# Patient Record
Sex: FEMALE | URBAN - METROPOLITAN AREA
[De-identification: names, ages, dates, MRNs, and addresses within clinical notes are randomized per-mention and may not be internally consistent; named-entity substitution may affect disease eponyms.]

---

## 2021-07-27 ENCOUNTER — HOSPITAL ENCOUNTER (OUTPATIENT)
Dept: PHYSICAL THERAPY | Facility: CLINIC | Age: 51
Setting detail: THERAPIES SERIES
Discharge: HOME | End: 2021-07-27
Payer: COMMERCIAL

## 2021-07-27 PROCEDURE — 97110 THERAPEUTIC EXERCISES: CPT

## 2021-07-27 PROCEDURE — 97161 PT EVAL LOW COMPLEX 20 MIN: CPT

## 2021-07-27 NOTE — CONSULTS
[] [address]  [address]  [phone]  F: [phone] [] [address]  [facility]   [address]  P: [phone]  F: [phone] [] [address]  [address]  P: [phone]  F: [phone] [] [address]  P: [phone]  F: [phone] [] [address]  [facility]   [address]   [place]: [phone]  F: [phone]      Physical Therapy Lower Extremity Evaluation    Date:  2021  Patient: Grazyna Mckenzie  : 1970  MRN: [id number]  Physician: [address] [place] [address] [facility]   Medical Diagnosis: G57.02- piriformis syndrome of left side  Rehab Codes: G57.02  Onset date: 2021  Next 's appt.: TBD- pending success with PT services     Subjective:   CC:    Reports symptoms began post 2021 with completing x5 mile walk   Similar condition in 2017, 2018- underwent PT services- with relief- however- symptoms less intense at that time   Issued muscle relaxer, ibuprofen- with relief- however- when ceases- pain returns  Attempted previous HEP interventions- no relief- caused inc pain   Denies R LE pain   New onset of L knee pain   Denies surgery thus far to either hip and knee     HPI: 2021; see above     PMHx: [x] Unremarkable [] Diabetes [x] HTN  [] Pacemaker   [] MI/Heart Problems [] Cancer [x] Arthritis [x] Other: asthma. [x] Refer to full medical chart  In EPIC     Comorbidities: NONE  [] Obesity [] Dialysis  [] N/A   [] Asthma/COPD [] Dementia [] Other:   [] Stroke [] Sleep apnea [] Other:   [] Vascular disease [] Rheumatic disease [] Other:     Tests: [] X-Ray: [] MRI:  [x] Other: nothing to L hip thus far.      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    Job Status [x]  Normal duty   [] Light duty   [] Off due to condition    []  Retired   [] Not employed   [] Disability  [] Other:  []  Return to work: Work activities/duties SEE ABOVE- not limited      Gait Prior level of function Current level of function    [x] Independent  [] Assist [x] Independent  [] Assist   Device: [] Independent [] Independent    [] Straight Cane [] Quad cane [] Straight Cane [] Quad cane    [] Standard walker [] Rolling walker   [] 4 wheeled walker [] Standard walker [] Rolling walker   [] 4 wheeled walker    [] Wheelchair [] Wheelchair     Pain:  [x] Yes  [] No Location: L buttock into distal calf; \"rope being pulled- tight;\" muscular pain    Pain Rating: (0-10 scale) 7/10- MIN/OVER THIS PAST WEEK; 10/10- MAX/OVER THIS PAST WEEK   Pain altered Tx:  [x] Yes  [] No  Action:  Symptoms:  [] Improving [x] Worsening [] Same  Better:  [] AM    [] PM    [] Sit    [] Rise/Sit    []Stand    [x] Walk/exercise    [] Lying    [] Other:  Worse: [] AM    [] PM    [] Sit    [] Rise/Sit    [x]Stand    [] Walk    [] Lying    [] Bend                      [] Valsalva    [] Other: sitting, sleeping upon either side, \"a little bit\" with ascending and descending stairs. Sleep: [] OK    [x] Disturbed- intermittent waking at night d/t inc pain, but able to reposition and fall back asleep    Denies L LE N/T, burning. Denies groin N/T. Intermittent LOB d/t avoiding WB through L LE. Previous L LE N/T immediately post second COVID vaccine.    Denies L hip click, catch- however- \"crack\" in AM, but without inc pain. Denies L LE buckling episodes. List of Red Flags:    Cervical Myelopathy Normal (-)/Abnormal (+)   Sensory disturbances in hand -   Wasting of hand muscles -   Unsteady gait -   Padilla's reflex -   Hyper-reflexia -   Bladder and bowel problems- incontinence or constipation of urine or fecal matter -   Multi-segmental weakness and/or sensory disturbances -     Neoplastic Conditions Normal (-)/Abnormal (+)   Age > 48 y.o. +   Previous history of cancer -   Unexplained weight loss- >10lb in 1 week -   Constant pain +   No relief of pain with bed rest -   Night pain- not relieved with change in body position -     Objective:    ROM  ° A/P STRENGTH TESTS (+/-) Left Right Not Tested    Left Right Left Right Ant. Drawer   [x]   Hip Flex 4-; P 4   Post. Drawer   [x]   Ext - -   Lachmans   [x]   ER 4 4   Valgus Stress   [x]   IR 4-; P 4   Varus Stress   [x]   ABD 4; P 4+   Heidis   [x]   ADD 4 4   Apleys Comp.    [x]   Knee Flex 4 4+   Apleys Dist.   [x]   Ext 4+; P with TKE 4+   Hip Scouring   [x]   Ankle DF - -   YULIETs   [x]   PF - -   Piriformis -; reports tenderness but does not repro symptoms in LE - []   INV - -   Jimmies   [x]   EVER - -   Talor Tilt   [x]        Pat-Fem Grind   [x]     Lumbar AROM:  75% with knees in extension- limited by \"her\" pain  FULL with knees in flexion- however- still complains of some pain   FULL extension- \"fine, just a little painful\"  FULL L SB- no pain  FULL R SB- but MOD pain   FULL rotation B- but MOD pain with R rotation     Slump test: immediate P with passive L knee ext, no change with passive L ankle DF  Relief with L piriformis stretch- IR/ER- within area of her typical pain     HS flexibility- -20 L LE, -30 R LE in 90/90 position     OBSERVATION No Deficit Deficit Not Tested Comments   Posture       Forward Head [] [x] []    Rounded Shoulders [] [x] []    Kyphosis [x] [] []    Lordosis [x] [] []    Lateral Shift [x] [] [] Scoliosis [x] [] []    Leg Length Discrp [] [] [x]    Slumped Sitting [] [x] [] SEE ABOVE. Palpation [] [x] [] L ischial tuberosity- just inferior, piriformis, glutes. Denies to L length of HS/sciatic nerve- however- inc tissue tension. Denies to L calf complex. Sensation [x] [] []    Edema [x] [] []    Neurological [x] [] []    Patellar Mobility [] [] [x]    Patellar Orientation [] [] [x]    Gait [] [x] [] Analysis: wide PADMINI; inc trunk flexion; dec WB through L LE.      FUNCTION Normal Difficult Unable   Sitting [] [x] []   Standing [] [x] []   Ambulation [] [x] []   Groom/Dress [x] [] []   Lift/Carry [] [x] []   Stairs [] [x] []   Bending [] [x] []   Squat [] [x] []   Kneel [] [x] []     BALANCE/PROPRIOCEPTION              [x] Not tested- d/t sig P   Single leg stance       R                     L                                PAIN   Eyes open                             Sec. Sec                  . []    Eyes closed                          Sec. Sec                  . []      Functional Test: LEFS  Score: 40% functionally impaired    [48/80]      Assessment:    Patient is a 44 y.o. pleasant female who presents to physical therapy initial evaluation with signs and symptoms consistent with potential L piriformis and sciatic nerve irritation. Patient demonstrates impairments and symptoms as detailed below in therapy goals. Patient currently limited in prolonged sitting, standing, sleeping, and ambulation tolerance secondary to these impairments and increased symptoms. Patient would benefit from continued physical therapy services in order to address these impairments and symptoms, and return to QOL, ADLs, work. Anticipated frequency detailed above. Prognosis limited secondary to prolonged or chronic history of current condition; x2 previous episodes of similar symptoms and relief with PT services; worst symptoms during this episode- justifying a low complexity evaluation.  If unable to achieve significant improvements within six to twelve visits, will consult referring physician and consider a follow-up visit with said physician. Patient verbalized understanding and agreement to all aforementioned statements. Patient would benefit from skilled physical therapy services in order to: inc L hip AROM, strength; and overall improve tolerance for prolonged sitting, standing, sleeping, and ambulation. Problems:    [x] ? Pain:  [x] ? ROM:  [x] ? Strength:  [x] ? Function:  [] Other:       STG: (to be met in 6 treatments)  1. ? Pain: Patient will report < 5/10 pain at rest and 7/10 pain with active movement [sit<>stand] in order to improve QOL. 2. ? ROM: Will demo improved L piriformis flexibility into IR/ER with < 3/10 P in order to improve transfer, sleeping tolerance. 3. ? Strength: Will demo 4+/5 L hip flexion, IR, HS with < 3/10 P in order to better match R and improve stair negotiation, ambulation tolerance. 4. ? Function: Patient will report decreased TTP to L piriformis, glutes in order to indicate decreased inflammation and improved healing of injured site. 5. Patient to be independent with home exercise program as demonstrated by performance with correct form without cues. LTG: (to be met in 12 treatments)  1. Will sit, stand for x30 minutes without having to shift/reposition and with < 3/10 P for improved QOL. 2. Improve LEFS to 25% limited                  Patient goals: \"I'd like to be able to go for a long walk again; maybe go back to the gym. \"     Rehab Potential:  [x] Good  [x] Fair  [] Poor   Suggested Professional Referral:  [] No  [x] Yes: potential back to referring MD if no success with PT services. Barriers to Goal Achievement:  [] No  [x] Yes: prolonged or chronic history of current condition; x2 previous episodes of similar symptoms and relief with PT services; worst symptoms during this episode.    Domestic Concerns:  [x] No  [] Yes:    Pt. Education: [x] Plans/Goals, Risks/Benefits discussed  [x] Home exercise program    Method of Education: [x] Verbal  [x] Demo  [x] Written  Comprehension of Education:  [x] Verbalizes understanding. [] Demonstrates understanding. [x] Needs Review. [] Demonstrates/verbalizes understanding of HEP/Ed previously given. Treatment Plan:  [x] Therapeutic Exercise   43244  [] Iontophoresis: 4 mg/mL Dexamethasone Sodium Phosphate  mAmin  39663   [x] Therapeutic Activity  41728 [x] Vasopneumatic cold with compression  11915    [] Gait Training   31810 [] Ultrasound   77825   [] Neuromuscular Re-education  70769 [] Electrical Stimulation Unattended  69205   [x] Manual Therapy  42611 [] Electrical Stimulation Attended  88382   [x] Instruction in HEP  [] Lumbar/Cervical Traction  09414   [] Aquatic Therapy   59245 [x] Cold/hotpack    [] Massage   11950      [] Dry Needling, 1 or 2 muscles  68083   [] Biofeedback, first 15 minutes   33794  [] Biofeedback, additional 15 minutes   99650 [] Dry Needling, 3 or more muscles  40571     []  Medication allergies reviewed for use of    Dexamethasone Sodium Phosphate 4mg/ml     with iontophoresis treatments. Pt is not allergic.     Frequency: 2 x/week for 12 visits    Todays Treatment:  Modalities: CP to L buttock- BEGIN NEXT VISIT  Precautions: potential L piriformis and sciatic nerve irritation- symptoms into distal calf; worse with sitting, sleeping, standing   Exercises: x2 previous episodes of similar symptoms and relief with PT services   Exercise Reps/ Time Weight/ Level Comments   Seated L sciatic nerve glides  x10, 5\" ea  In slumped position- active L knee extension; approach pain, then release; HEP   Seated L piriformis str.- IR/ER x15\" ea  HEP         Supine L sciatic nerve glides  X10, 5\" ea  Hip flexion, knee extension, then active ankle DF/PF; HEP   Supine L piriformis str.- IR/ER   HEP                                 Other: Assess SLS balance and squat form when with PT next visit. Specific Instructions for next treatment: Follow-up with pt regarding tolerance to new, updated HEP handout- continue if proven beneficial or modify PRN. Consider hypervolt with TPR to L buttock- piriformis glutes; foam roller massage to L sciatic nerve/posterior thigh next visit.      Evaluation Complexity:  History (Personal factors, comorbidities) [] 0 [x] 1-2 [] 3+   Exam (limitations, restrictions) [] 1-2 [x] 3 [] 4+   Clinical presentation (progression) [x] Stable [] Evolving  [] Unstable   Decision Making [x] Low [] Moderate [] High    [x] Low Complexity [] Moderate Complexity [] High Complexity     Treatment Charges: Mins Units   [x] Evaluation       [x]  Low       []  Moderate       []  High 35 1   []  Modalities     [x]  Ther Exercise 10 1   []  Manual Therapy     []  Ther Activities     []  Aquatics     []  Vasocompression     []  Other       TOTAL TREATMENT TIME: 45    Time in: 8:07AM   Time Out: 8:52AM    Electronically signed by: Brianna Saucedo, PT

## 2021-08-03 ENCOUNTER — HOSPITAL ENCOUNTER (OUTPATIENT)
Dept: PHYSICAL THERAPY | Facility: CLINIC | Age: 51
Setting detail: THERAPIES SERIES
Discharge: HOME | End: 2021-08-03
Payer: COMMERCIAL

## 2021-08-03 PROCEDURE — 97110 THERAPEUTIC EXERCISES: CPT

## 2021-08-03 PROCEDURE — 97140 MANUAL THERAPY 1/> REGIONS: CPT

## 2021-08-03 NOTE — FLOWSHEET NOTE
[] [facility]) [facility] &  Therapy  [address]  [phone]  F: [phone] [] [address]  [address]   [address]  P: [phone]  F: [phone] [] [place]  [address]  P: [phone]  F: [phone] [x] [address]  P: [phone]  F: [phone] [] [address]  [facility]   [address]   [place]: [phone]  F: [phone]      Physical Therapy Daily Treatment Note    Date:  8/3/2021  Patient Name:  Darshan Cope    :  1970  MRN: [id number]  [facility]- [facility] visits   Medical Diagnosis: G57.02- piriformis syndrome of left side                      Rehab Codes: G57.02  Onset date: 2021             Next Dr's appt.: TBD- pending success with PT services   Visit# / total visits:      Cancels/No Shows: 0/0    Subjective:    Pain:  [x] Yes  [] No Location: L piriformis/buttock Pain Rating: (0-10 scale) 5-6/10 [despite clarification of pain scale]   Pain altered Tx:  [x] No  [] Yes  Action: N/A  Comments: \"It's okay today. \" \"Still there- different from the R side. \" \"Really hurts when I first get out of the bed in the morning [however reports relief with ambulation and movement]. \" \"Little bit better [regarding tolerance to new, updated HEP handout]. \" Reports relief with performing cryotherapy at home. Inquires regarding future xray/MRI if no success with PT services.      Todays Treatment:  Modalities: CP to L buttock in prone for x10' post finish of treatment session   Precautions: potential L piriformis and sciatic nerve irritation- symptoms into distal calf; worse with sitting, sleeping, standing   Exercises: x2 previous episodes of similar symptoms and relief with PT services   Exercise Reps/ Time Weight/ Level Comments   Assessment of SLS balance, squat form    COMPLETED- 8/3/2021    Education- 8/3/2021   Instructed to perform x1 set of HEP prior to taking first steps from bed each AM- verbalized understanding to all    Seated L sciatic nerve glides  x10, 5\" ea   In slumped position- active L knee extension; approach pain, then release; HEP- reviewed    Seated L piriformis str.- IR/ER x15\" ea   HEP             Supine L sciatic nerve glides  2x10, 5\" ea   Hip flexion, knee extension, then active ankle DF/PF; HEP- reviewed  Post MT   Supine L piriformis str.- IR/ER 2x30\" ea   HEP- reviewed   Gentle OP upon knee with ER  Post MT   Supine bridge  x20   NEW- 8/3/2021- denies P  Cues for inc glute set              S/L clam, reverse clam- B x20 ea AROM NEW- 8/3/2021- denies P             Modified squat to MOD height mat table  x20    NEW- 8/3/2021- denies P                                     Other: Consider L SIJD assessment in future- PRN. No difference R VS L with S/L clams, reverse clams. Reports inc P with supine<>sit transfer- again within L buttock. Assessment of SLS balance, squat form: modified squat to MOD height mat table with good form without pain; SLS balance- L LE: 20\"- no P, R LE: 15\". MT: hypervolt with sphere attachment on lowest setting to L piriformis/glutes- x5'; then followed with 1/2 FR massage with MOD OP to L hamstrings/sciatic nerve- x5'- reports relief with all. Denies P mid-way through treatment session.      Treatment Charges: Mins Units   [x]  Modalities- CP 10 -   [x]  Ther Exercise 31 2   [x]  Manual Therapy 10 1   []  Ther Activities     []  Aquatics     []  Vasocompression     []  Other     Total Treatment time 51 3     Assessment: [x] Progressing toward goals. [] No change. [x] Other: Pt presents with MOD pain, despite stating that \"it's okay. \" Reports MIN relief with new, updated HEP handout thus far. Therefore, began session with MT techniques to L buttock and posterior thigh per above- reports relief. Proceeded with review of all HEP interventions in order to ensure proper form, but also, for inc L sciatic nerve and piriformis tissue mobility- denies P with all. Able to begin hip and core strengthening without inc pain- including modified squats to mat table. Finished with CP for pain and inflammation management. Consider ideas listed below next visit. [x] Patient would continue to benefit from skilled physical therapy services in order to: inc L hip AROM, strength; and overall improve tolerance for prolonged sitting, standing, sleeping, and ambulation. STG: (to be met in 6 treatments)  1. ? Pain: Patient will report < 5/10 pain at rest and 7/10 pain with active movement [sit<>stand] in order to improve QOL. 2. ? ROM: Will demo improved L piriformis flexibility into IR/ER with < 3/10 P in order to improve transfer, sleeping tolerance. 3. ? Strength: Will demo 4+/5 L hip flexion, IR, HS with < 3/10 P in order to better match R and improve stair negotiation, ambulation tolerance. 4. ? Function: Patient will report decreased TTP to L piriformis, glutes in order to indicate decreased inflammation and improved healing of injured site. 5. Patient to be independent with home exercise program as demonstrated by performance with correct form without cues. LTG: (to be met in 12 treatments)  1. Will sit, stand for x30 minutes without having to shift/reposition and with < 3/10 P for improved QOL. 2. Improve LEFS to 25% limited                  Patient goals: \"I'd like to be able to go for a long walk again; maybe go back to the gym. \"     Pt. Education:  [x] Yes  [] No  [x] Reviewed Prior HEP/Ed  Method of Education: [] Verbal  [] Demo  [] Written  Comprehension of Education:  [] Verbalizes understanding. [] Demonstrates understanding. [] Needs review.   [x] Demonstrates/verbalizes HEP/Ed previously given. Plan: [x] Continue current frequency toward long and short term goals. [x] Specific Instructions for subsequent treatments: Follow-up with pt regarding tolerance to first treatment session- continue if proven beneficial or modify PRN.        Time In: 10:04AM            Time Out: 10:55AM    Electronically signed by:  Shirly Bence, PT

## 2021-08-05 ENCOUNTER — HOSPITAL ENCOUNTER (OUTPATIENT)
Dept: PHYSICAL THERAPY | Facility: CLINIC | Age: 51
Setting detail: THERAPIES SERIES
Discharge: HOME | End: 2021-08-05
Payer: COMMERCIAL

## 2021-08-05 PROCEDURE — 97140 MANUAL THERAPY 1/> REGIONS: CPT

## 2021-08-05 PROCEDURE — 97110 THERAPEUTIC EXERCISES: CPT

## 2021-08-05 NOTE — FLOWSHEET NOTE
[] [facility]) - [facility] &  Therapy  [address]  [phone]  F: [phone] [] [address]  [facility]   [address]  P: [phone]  F: [phone] [] [address]  [address] [facility]  P: [phone]  F: [phone] [x] [address]  P: [phone]  F: [phone] [] [address]  [facility]   [address]   [place]: [phone]  F: [phone]      Physical Therapy Daily Treatment Note    Date:  2021  Patient Name:  Kong Stephen    :  1970  MRN: [id number]  Physician: [address] [place] [address] Blvd.: [address]- 60 visits   Medical Diagnosis: G57.02- piriformis syndrome of left side                      Rehab Codes: G57.02  Onset date: 2021             Next 's appt.: TBD- pending success with PT services   Visit# / total visits: 3/12     Cancels/No Shows: 0/0    Subjective:    Pain:  [x] Yes  [] No Location: L piriformis/buttock Pain Rating: (0-10 scale) 7/10 [despite clarification of pain scale]   Pain altered Tx:  [x] No  [] Yes  Action: N/A  Comments: \"It was okay [regarding tolerance to first treatment session- reports relief]; but I think I over did it yesterday- picking up garbage, sticks- I don't have a . \" Performed for approximately x2 hours. Reports inc pain prior to beginning prolonged session of gardening yesterday. Reports attempting HEP interventions first thing in AM, however, unable to lift L LE to complete supine sciatic nerve glides. Pt approximately x5' late to treatment appointment.      Todays Treatment:  Modalities: HP to L lumbar and buttock in prone for x6' at start of treatment session; CP to L buttock in prone for x10' post finish of treatment session Precautions: potential L piriformis and sciatic nerve irritation- symptoms into distal calf; worse with sitting, sleeping, standing   Exercises: x2 previous episodes of similar symptoms and relief with PT services   Exercise Reps/ Time Weight/ Level Comments   Education    Instructed in activity modifications- avoid prolonged; break-up activity into 30 minute sessions- perform HEP interventions throughout, cease if inc pain- verbalized understanding to all    Assessment of SLS balance, squat form    COMPLETED- 8/3/2021    Education- 8/3/2021   Instructed to perform x1 set of HEP prior to taking first steps from bed each AM- verbalized understanding to all    Seated L sciatic nerve glides  x20, 5\" ea   In slumped position- active L knee extension; approach pain, then release; HEP- reviewed    Seated L piriformis str.- IR/ER x15\" ea   HEP             Supine L sciatic nerve glides  x20, 5\" ea   Hip flexion, knee extension, then active ankle DF/PF; HEP- reviewed  Post MT   Supine L piriformis str.- IR/ER 2x30\" ea   HEP- reviewed   Gentle OP upon knee with ER  Post MT   Supine bridge  x20   NEW- 8/3/2021- denies P  Cues for inc glute set              S/L clam, reverse clam- B x20 ea AROM NEW- 8/3/2021- denies P             Modified squat to MOD height mat table  x20    NEW- 8/3/2021- denies P                                     Other: Consider L SIJD assessment in future- PRN. Pt reports R LE longer than L LE. Assessment of SLS balance, squat form: modified squat to MOD height mat table with good form without pain; SLS balance- L LE: 20\"- no P, R LE: 15\".      MT: hypervolt with sphere attachment on lowest setting to L piriformis/glutes- x6'; then followed with 1/2 FR massage with MOD OP to L hamstrings/sciatic nerve- x6'- reports relief with all.      4/10 P mid-way through treatment session.      Treatment Charges: Mins Units   [x]  Modalities- HP, CP 6, 10 -   [x]  Ther Exercise 24 2   [x]  Manual Therapy 10 1 []  Ther Activities     []  Aquatics     []  Vasocompression     []  Other     Total Treatment time 50 3     Assessment: [] Progressing toward goals. [] No change. [x] Other: Pt presents with inc pain secondary to over-activity gardening for x2 hours yesterday- involving frequent forward bending. Pt also reports experiencing inc pain prior to participating in extensive gardening. Educated pt per grid above- verbalized understanding to all. Began session with HP for inc tissue flexibility- proceeded with MT techniques- again reports relief. Next, reviewed previous interventions- did not progress as inc pain today. Finished with CP for muscle soreness, and finally, seated L sciatic nerve glides. Reports dec pain compared with start of session. Consider ideas listed below next visit. [x] Patient would continue to benefit from skilled physical therapy services in order to: inc L hip AROM, strength; and overall improve tolerance for prolonged sitting, standing, sleeping, and ambulation. STG: (to be met in 6 treatments)  1. ? Pain: Patient will report < 5/10 pain at rest and 7/10 pain with active movement [sit<>stand] in order to improve QOL. 2. ? ROM: Will demo improved L piriformis flexibility into IR/ER with < 3/10 P in order to improve transfer, sleeping tolerance. 3. ? Strength: Will demo 4+/5 L hip flexion, IR, HS with < 3/10 P in order to better match R and improve stair negotiation, ambulation tolerance. 4. ? Function: Patient will report decreased TTP to L piriformis, glutes in order to indicate decreased inflammation and improved healing of injured site. 5. Patient to be independent with home exercise program as demonstrated by performance with correct form without cues. LTG: (to be met in 12 treatments)  1. Will sit, stand for x30 minutes without having to shift/reposition and with < 3/10 P for improved QOL.    2. Improve LEFS to 25% limited                  Patient goals: \"I'd like to be able to go for a long walk again; maybe go back to the gym. \"     Pt. Education:  [x] Yes  [] No  [x] Reviewed Prior HEP/Ed  Method of Education: [] Verbal  [] Demo  [] Written  Comprehension of Education:  [] Verbalizes understanding. [] Demonstrates understanding. [] Needs review. [x] Demonstrates/verbalizes HEP/Ed previously given. Plan: [x] Continue current frequency toward long and short term goals. [x] Specific Instructions for subsequent treatments: Assess for L SIJD- including leg length discrepancy- next visit- implement MET if appropriate.        Time In: 10:06AM          Time Out: 10:56AM    Electronically signed by:  Rosalie Favre, PT

## 2021-08-10 ENCOUNTER — HOSPITAL ENCOUNTER (OUTPATIENT)
Dept: PHYSICAL THERAPY | Facility: CLINIC | Age: 51
Setting detail: THERAPIES SERIES
Discharge: HOME | End: 2021-08-10
Payer: COMMERCIAL

## 2021-08-10 PROCEDURE — 97110 THERAPEUTIC EXERCISES: CPT

## 2021-08-10 PROCEDURE — 97140 MANUAL THERAPY 1/> REGIONS: CPT

## 2021-08-10 NOTE — FLOWSHEET NOTE
[] [facility]) - [facility] &  Therapy  [address]  [phone]  F: [phone] [] [address]  [facility] [address]   [address]  P: [phone]  F: [phone] [] [address]  [address] [facility]  P: [phone]  F: [phone] [x] [address]  P: [phone]  F: [phone] [] [address]  [facility]   [address]   [place]: [phone]  F: [phone]      Physical Therapy Daily Treatment Note    Date:  8/10/2021  Patient Name:  Juan Medley    :  1970  MRN: [id number]  [facility] [address]- 61 visits   Medical Diagnosis: G57.02- piriformis syndrome of left side                      Rehab Codes: G57.02  Onset date: 2021             Next Dr's appt.: TBD- pending success with PT services   Visit# / total visits:      Cancels/No Shows: 0/0    Subjective:    Pain:  [x] Yes  [] No Location: L piriformis/buttock Pain Rating: (0-10 scale) 4/10; 5/10- calf   Pain altered Tx:  [x] No  [] Yes  Action: N/A   Comments: \"It was fine [regarding tolerance to last treatment session]. \" Reports new onset of B foot dorsal surface N/T- over past few days [later reveals experiences only in AM]. \"My calf is tight- always there [later reveals experiences next day post standing, ambulating for prolonged periods of time]. \" Reports attempting x3 mile walk on  with - inc pain next AM. Prema Feng reveals, taking x3 breaks secondary to inc pain during ambulation with ]. \"Do have improvement here [indicates L buttock]. \"      Reveals chronic L calf cramping since last pregnancy- worst in PM.     Pt approximately x5' late to treatment appointment.      Todays Treatment:  Modalities: HP to L lumbar and buttock in prone for x6' at start of treatment session- HELD- 8/10/2021; CP to L buttock in prone for x10' post finish of treatment session   Precautions: potential L piriformis and sciatic nerve irritation- symptoms into distal calf; worse with sitting, sleeping, standing   Exercises: relief with PT services for L hip pain- denies for these specific symptoms   Exercise Reps/ Time Weight/ Level Comments   Education    Instructed in activity modifications- avoid prolonged; break-up activity into 30 minute sessions- perform HEP interventions throughout, cease if inc pain- verbalized understanding to all    Assessment of SLS balance, squat form    COMPLETED- 8/3/2021    Education- 8/3/2021   Instructed to perform x1 set of HEP prior to taking first steps from bed each AM- verbalized understanding to all    Seated L sciatic nerve glides  x20, 5\" ea   In slumped position- active L knee extension; approach pain, then release; HEP- reviewed    Seated L piriformis str.- IR/ER x15\" ea   HEP             Supine L sciatic nerve glides  x20, 5\" ea   Hip flexion, knee extension, then active ankle DF/PF; HEP- reviewed  Post MT   Supine L piriformis str.- IR/ER 2x30\" ea   HEP- reviewed   Gentle OP upon knee with ER  Post MT   Supine bridge  x20   NEW- 8/3/2021- denies P  Cues for inc glute set              S/L clam, reverse clam- B x20 ea AROM NEW- 8/3/2021- denies P             Modified squat to MOD height mat table  x20    NEW- 8/3/2021- denies P         Repeated motions assessment- 8/10/2021     Inc with RFIS, no change with MILAGROS  No calf pain with prone lay x3'  No calf pain with prone prop x3'   Similar response to 2x10 press-ups onto hands   HEP   Prone hip extension- B  2x10   Denies P  HEP   Prone hip ER isometric/heel-dig  2x10, 3\"   Denies P  Reports L lumbar \"pressure\"    HEP education    COMPLETED- 8/10/2021                                Other: Consider L SIJD assessment in future- PRN.  Pt reports R LE longer than L LE.     RED= held 8/10/2021    MT: hypervolt with sphere attachment on lowest setting to L piriformis/glutes- x6'; then followed with 1/2 FR massage [hypervolt] with MOD OP to L hamstrings/sciatic nerve- x6'- reports relief with all. L calf is not tender to touch- demos no inc tissue tension        Treatment Charges: Mins Units   [x]  Modalities- CP 10 -   [x]  Ther Exercise 28  2   [x]  Manual Therapy 12 1   []  Ther Activities     []  Aquatics     []  Vasocompression     []  Other     Total Treatment time 50 3     Assessment: [] Progressing toward goals. [] No change. [x] Other: Pt presents with dec L buttock/thigh pain, however, intensified L calf \"tightness. \" Reports attempting x3 miles of ambulation with  on Sunday, with resulting inc P and symptoms the next AM. Therefore, began session with repeated motions testing- results as detailed above and below- potential preference for lumbar extension. Therefore, educated per grid above and below- will follow-up at next visit. Denies all symptoms at finish. [x] Patient would continue to benefit from skilled physical therapy services in order to: inc L hip AROM, strength; and overall improve tolerance for prolonged sitting, standing, sleeping, and ambulation. STG: (to be met in 6 treatments)  1. ? Pain: Patient will report < 5/10 pain at rest and 7/10 pain with active movement [sit<>stand] in order to improve QOL. 2. ? ROM: Will demo improved L piriformis flexibility into IR/ER with < 3/10 P in order to improve transfer, sleeping tolerance. 3. ? Strength: Will demo 4+/5 L hip flexion, IR, HS with < 3/10 P in order to better match R and improve stair negotiation, ambulation tolerance. 4. ? Function: Patient will report decreased TTP to L piriformis, glutes in order to indicate decreased inflammation and improved healing of injured site.    5. Patient to be independent with home exercise program as demonstrated by performance with correct form without cues. LTG: (to be met in 12 treatments)  1. Will sit, stand for x30 minutes without having to shift/reposition and with < 3/10 P for improved QOL. 2. Improve LEFS to 25% limited                  Patient goals: \"I'd like to be able to go for a long walk again; maybe go back to the gym. \"     Pt. Education:  [x] Yes  [] No  [x] Reviewed Prior HEP/Ed  Method of Education: [] Verbal  [] Demo  [] Written  Comprehension of Education:  [] Verbalizes understanding. [] Demonstrates understanding. [] Needs review. [x] Demonstrates/verbalizes HEP/Ed previously given. 8/10/2021- See grid above for details. Plan: [x] Continue current frequency toward long and short term goals. [x] Specific Instructions for subsequent treatments: Follow-up with pt regarding tolerance to transition to lumbar spine extension bias- continue if proven beneficial or modify PRN. Assess for L SIJD- including leg length discrepancy- next visit- implement MET if appropriate.        Time In: 10:05AM        Time Out: 10:55AM    Electronically signed by:  Brianna Saucedo, PT

## 2021-08-12 ENCOUNTER — HOSPITAL ENCOUNTER (OUTPATIENT)
Dept: PHYSICAL THERAPY | Facility: CLINIC | Age: 51
Setting detail: THERAPIES SERIES
Discharge: HOME | End: 2021-08-12
Payer: COMMERCIAL

## 2021-08-12 PROCEDURE — 97140 MANUAL THERAPY 1/> REGIONS: CPT

## 2021-08-12 PROCEDURE — 97110 THERAPEUTIC EXERCISES: CPT

## 2021-08-12 NOTE — FLOWSHEET NOTE
[] [address]  [address]  [phone]  F: [phone] [] [address]  [facility]   [address]  P: [phone]  F: [phone] [] [place]  [address] [facility] [address] [address]  P: [phone]  F: [phone] [x] [address]  P: [phone]  F: [phone] [] [address]  [facility]   [address]   [place]: [phone]  F: [phone]      Physical Therapy Daily Treatment Note    Date:  2021  Patient Name:  Christiano Kat    :  1970  MRN: [id number]  Physician: [address] Stevenson Fernandez- 60 visits   Medical Diagnosis: G57.02- piriformis syndrome of left side                      Rehab Codes: G57.02  Onset date: 2021             Next 's appt.: TBD- pending success with PT services   Visit# / total visits:      Cancels/No Shows: 0/0     Subjective:    Pain:  [x] Yes  [] No Location: L piriformis/buttock/calf Pain Rating: (0-10 scale) 0/10- calf; un-rated, but slight L piriformis/buttock with ambulation/sitting in clinic today   Pain altered Tx:  [x] No  [] Yes  Action: N/A   Comments: Denies calf pain upon entering clinic- however- slight P this AM. Reports relief post last, modified treatment session- which included lumbar extension bias. Relief with new, updated HEP handout- however- only performed x1-2 per day- instead of x6 per day as instructed by primary PT. Pt approximately x5' late to treatment appointment.      Todays Treatment:  Modalities: HP to L lumbar and buttock in prone for x6' at start of treatment session- HELD- 8/10/2021, future appointments; CP to L buttock in prone for x10' post finish of treatment session   Precautions: potential L piriformis and sciatic nerve irritation- symptoms into distal calf; worse with sitting, sleeping, standing   Exercises: relief with PT services for L hip pain- denies for these specific symptoms   Exercise Reps/ Time Weight/ Level Comments   Education    Instructed in activity modifications- avoid prolonged; break-up activity into 30 minute sessions- perform HEP interventions throughout, cease if inc pain- verbalized understanding to all    Assessment of SLS balance, squat form    COMPLETED- 8/3/2021    Education- 8/3/2021   Instructed to perform x1 set of HEP prior to taking first steps from bed each AM- verbalized understanding to all    Seated L sciatic nerve glides  x20, 5\" ea   In slumped position- active L knee extension; approach pain, then release; HEP- reviewed    Seated L piriformis str.- IR/ER x15\" ea   HEP             Supine L sciatic nerve glides  x20, 5\" ea   Hip flexion, knee extension, then active ankle DF/PF; HEP- reviewed  Post MT   Supine L piriformis str.- IR/ER 2x30\" ea   HEP- reviewed   Gentle OP upon knee with ER  Post MT   Supine bridge  x20   NEW- 8/3/2021- denies P  Cues for inc glute set              S/L clam, reverse clam- B x20 ea AROM NEW- 8/3/2021- denies P             Modified squat to MOD height mat table  x20    NEW- 8/3/2021- denies P         Repeated motions assessment- 8/10/2021     Inc with RFIS, no change with MILAGROS  No calf pain with prone lay x3'  No calf pain with prone prop x3'   Similar response to 2x10 press-ups onto hands   HEP- reviewed     Press-ups also performed at finish of session- 2x10    Press-ups with PT OP 3 sets  3 differentlumbar levels   Reports relief upon lumbar spine   NEW- 8/12/2021    Prone hip extension- B- ALT 2x10   Denies P  HEP- reviewed   Cues for glute squeeze    Prone hip ER isometric/heel-dig  x20, 3\" ea  Denies P  Reports L lumbar \"pressure\"    Prone alt UE/LE lifts  2x10 ea  NEW- 8/12/2021- denies calf P   Bridges  2x10 ea  NEW- 8/12/2021- transfer, sleeping tolerance. 3. ? Strength: Will demo 4+/5 L hip flexion, IR, HS with < 3/10 P in order to better match R and improve stair negotiation, ambulation tolerance. 4. ? Function: Patient will report decreased TTP to L piriformis, glutes in order to indicate decreased inflammation and improved healing of injured site. 5. Patient to be independent with home exercise program as demonstrated by performance with correct form without cues. LTG: (to be met in 12 treatments)  1. Will sit, stand for x30 minutes without having to shift/reposition and with < 3/10 P for improved QOL. 2. Improve LEFS to 25% limited                  Patient goals: \"I'd like to be able to go for a long walk again; maybe go back to the gym. \"     Pt. Education:  [x] Yes  [] No  [x] Reviewed Prior HEP/Ed  Method of Education: [] Verbal  [] Demo  [] Written  Comprehension of Education:  [] Verbalizes understanding. [] Demonstrates understanding. [] Needs review. [x] Demonstrates/verbalizes HEP/Ed previously given. 8/10/2021- See grid above for details. Plan: [x] Continue current frequency toward long and short term goals. [x] Specific Instructions for subsequent treatments: Follow-up with pt regarding vacation and tolerance to lumbar spine extension bias- continue if proven beneficial or modify PRN. May be appropriate for follow-up with referring MD if unable to achieve sig change in her symptoms- PN next visit. Assess for L SIJD- including leg length discrepancy- next visit- implement MET if appropriate.        Time In: 10:05AM        Time Out: 10:55AM    Electronically signed by:  Sanam Razo PT

## 2021-08-24 ENCOUNTER — HOSPITAL ENCOUNTER (OUTPATIENT)
Dept: PHYSICAL THERAPY | Facility: CLINIC | Age: 51
Setting detail: THERAPIES SERIES
Discharge: HOME | End: 2021-08-24
Payer: COMMERCIAL

## 2021-08-24 PROCEDURE — 97110 THERAPEUTIC EXERCISES: CPT

## 2021-08-24 PROCEDURE — 97140 MANUAL THERAPY 1/> REGIONS: CPT

## 2021-08-24 NOTE — FLOWSHEET NOTE
[] [facility]  [address]  [phone]  F: [phone] [] [address]  [facility] [address]   [address]  P: [phone]  F: [phone] [] [place]  [address]  P: [phone]  F: [phone] [x] [address]  P: [phone]  F: [phone] [] [address]  [facility]   [address]   [place]: [phone]  F: [phone]      Physical Therapy Daily Treatment Note    Date:  2021  Patient Name:  Monica Avalos    :  1970  MRN: [id number]  [facility] [address]- 61 visits   Medical Diagnosis: G57.02- piriformis syndrome of left side                      Rehab Codes: G57.02  Onset date: 2021             Next Dr's appt. : potential next week   Visit# / total visits:      Cancels/No Shows: 0/0     Subjective:    Pain:  [x] Yes  [] No Location: L piriformis/buttock/calf   Pain Rating: (0-10 scale) 3/10- calf; 2/10- L piriformis/buttock with ambulation/sitting in clinic today   Pain altered Tx:  [x] No  [] Yes  Action: N/A   Comments: \"Occasionally- my calf would be tight; and then one time- my butt bone- was really painful [indicates with ambulation or sitting when questioned]; and then when I came home- probably from sitting too long on the plane. \" However, reports being able to walk and hike on the majority of her trip without inc P- ambulating almost x5.2 miles. Plans to visit referring MD next week. Good compliance, tolerance to current HEP interventions. Pt approximately x8' late to treatment appointment- but able to accommodate.      Todays Treatment:  Modalities: HP to L lumbar and buttock in prone for x6' at start of treatment session- HELD- 8/10/2021, future appointments; CP to L buttock in prone for x10' post finish of treatment session   Precautions: potential L piriformis and sciatic nerve irritation- symptoms into distal calf; worse with sitting, sleeping, standing   Exercises: relief with PT services for L hip pain- denies for these specific symptoms   Exercise Reps/ Time Weight/ Level Comments   Goal update, PT POC education    COMPLETED- 8/24/2021    Education    Instructed in activity modifications- avoid prolonged; break-up activity into 30 minute sessions- perform HEP interventions throughout, cease if inc pain- verbalized understanding to all    Assessment of SLS balance, squat form    COMPLETED- 8/3/2021    Education- 8/3/2021   Instructed to perform x1 set of HEP prior to taking first steps from bed each AM- verbalized understanding to all    Seated L sciatic nerve glides  x20, 5\" ea   In slumped position- active L knee extension; approach pain, then release; HEP- reviewed    Seated L piriformis str.- IR/ER x15\" ea   HEP             Supine L sciatic nerve glides  x20, 5\" ea   Hip flexion, knee extension, then active ankle DF/PF; HEP- reviewed  Post MT   Supine L piriformis str.- IR/ER 2x30\" ea   HEP- reviewed   Gentle OP upon knee with ER  Post MT   Supine bridge  x20   NEW- 8/3/2021- denies P  Cues for inc glute set              S/L clam, reverse clam- B x20 ea AROM NEW- 8/3/2021- denies P             Modified squat to MOD height mat table  x20    NEW- 8/3/2021- denies P         Repeated motions assessment- 8/10/2021     Inc with RFIS, no change with MILAGROS  No calf pain with prone lay x3'  No calf pain with prone prop x3'   Similar response to 2x10 press-ups onto hands   HEP- reviewed     Press-ups also performed at finish of session- 2x10    Press-ups with PT OP 3 sets  3 differentlumbar levels   Reports relief upon lumbar spine   NEW- 8/12/2021    Prone prop  x3'   At start of session    Press-up with exhale-sag  2x10 ea    x10  NEW- 8/24/2021   For inc lumbar extension     Third set at finish, just prior to CP   Prone hip extension- B- ALT 2x10   Denies P  HEP- reviewed   Cues for glute squeeze    Prone hip ER isometric/heel-dig  x20, 3\" ea  Denies P   Prone alt UE/LE lifts  2x10 ea  NEW- 8/12/2021- denies calf P   Supermans x10, 5\" emmanuel  NEW- 8/24/2021- slight calf P near finish of set    Bridges  2x10 ea  NEW- 8/12/2021- denies calf P   HEP education    COMPLETED- 8/10/2021   Emphasized at least x3 per day while on vacation; must be repeated in order to determine true effect upon L-side symptoms; if suspect lumbar spine involvement, may benefit from formal assessment by referring MD/xray soon- verbalized understanding to all                                Other: BOLD is completed. Consider L SIJD assessment in future- PRN. Pt reports R LE longer than L LE.     MT: hypervolt with sphere attachment on lowest setting to L piriformis/glutes- x6'; then followed with 1/2 FR massage [hypervolt] with MOD OP to L hamstrings/sciatic nerve- x6'- reports relief with all. Treatment Charges: Mins Units   [x]  Modalities- CP 10 -   [x]  Ther Exercise 32 2   [x]  Manual Therapy 10 1   []  Ther Activities     []  Aquatics     []  Vasocompression     []  Other     Total Treatment time 52 3     Assessment: [x] Progressing toward goals. Pt presents with slight symptoms, however, good compliance, tolerance to current HEP interventions while away on vacation. Pt also reports being able to ambulate and hike on majority without inc P. Therefore, continued prone progression this date. Able to perform prone press-up with exhale-sag and prone supermans without inc pain or symptoms. Continued MT and CP- continues to report great relief. Finished with goal update for updated PT POC to referring MD- results as detailed above and below- demos progress or meets majority of therapy goals.  Rec continued PT services at x1 week, for x4 visits, in order to return to [facility] strengthening interventions- verbalized understanding to all. Consider ideas listed below next visit. [] No change. [] Other:  [x] Patient would continue to benefit from skilled physical therapy services in order to: inc L hip AROM, strength; and overall improve tolerance for prolonged sitting, standing, sleeping, and ambulation. STG: (to be met in 6 treatments)  1. ? Pain: Patient will report < 5/10 pain at rest and 7/10 pain with active movement [sit<>stand] in order to improve QOL. - PROGRESSING- 2/10-6/10 over this past week   2. ? ROM: Will demo improved L piriformis flexibility into IR/ER with < 3/10 P in order to improve transfer, sleeping tolerance. - MET   3. ? Strength: Will demo 4+/5 L hip flexion, IR, HS with < 3/10 P in order to better match R and improve stair negotiation, ambulation tolerance. - WILL ASSESS AT LAST VISIT    4. ? Function: Patient will report decreased TTP to L piriformis, glutes in order to indicate decreased inflammation and improved healing of injured site. - MET   5. Patient to be independent with home exercise program as demonstrated by performance with correct form without cues. - MET  LTG: (to be met in 12 treatments)  1. Will sit, stand for x30 minutes without having to shift/reposition and with < 3/10 P for improved QOL.- MET  2. Improve LEFS to 25% limited                  Patient goals: \"I'd like to be able to go for a long walk again; maybe go back to the gym. \"- PROGRESSING    Pt. Education:  [x] Yes  [] No  [x] Reviewed Prior HEP/Ed  Method of Education: [] Verbal  [] Demo  [] Written  Comprehension of Education:  [] Verbalizes understanding. [] Demonstrates understanding. [] Needs review. [x] Demonstrates/verbalizes HEP/Ed previously given. 8/10/2021- See grid above for details. Plan: [x] Continue current frequency toward long and short term goals. [x] Specific Instructions for subsequent treatments: Assess LEFS.  Trial TM ambulation; modified squats; and quadruped T.A. interventions next visit. Assess for L SIJD- including leg length discrepancy- next visit- implement MET if appropriate.        Time In: 10:08AM        Time Out: 11AM    Electronically signed by:  Cruz Sewell PT

## 2021-08-24 NOTE — PLAN OF CARE
[] [facility] [facility]  [address]  [phone]  F: [phone] [] [address]  [facility]   [address]  P: [phone]  F: [phone] [] [place]  [address]  P: [phone]  F: [phone] [x] [address]  P: [phone]  F: [phone] [] [address]  [facility]   [address]   [place]: [phone]  F: [phone]        Physical Therapy Plan of Care    Date:  2021  Patient: Teresa Jenkins  : 1970  MRN: [id number]  Physician: Lizzeth Salvador                    Insurance: [facility] [place]- 57 visits   Medical Diagnosis: G57.02- piriformis syndrome of left side                      Rehab Codes: G57.02  Onset date: 2021             Next Dr's appt. : potential next week   Visit# / total visits:                                   Cancels/No Shows: 0/0     Subjective:    Pain:  [x]? Yes  []? No   Location: L piriformis/buttock/calf         Pain Rating: (0-10 scale) 3/10- calf; 2/10- L piriformis/buttock with ambulation/sitting in clinic today   Pain altered Tx:  [x]? No  []? Yes  Action: N/A   Comments: \"Occasionally- my calf would be tight; and then one time- my butt bone- was really painful [indicates with ambulation or sitting when questioned]; and then when I came home- probably from sitting too long on the plane. \" However, reports being able to walk and hike on the majority of her trip without inc P- ambulating almost x5.2 miles. Plans to visit referring MD next week. Good compliance, tolerance to current HEP interventions.      Pt approximately x8' late to treatment appointment- but able to accommodate.      Assessment:  Pt presents with slight symptoms, however, good compliance, tolerance to current HEP interventions while away on vacation. Pt also reports being able to ambulate and hike on majority without inc P. Therefore, continued prone progression this date. Able to perform prone press-up with exhale-sag and prone supermans without inc pain or symptoms. Continued MT and CP- continues to report great relief. Finished with goal update for updated PT POC to referring MD- results as detailed above and below- demos progress or meets majority of therapy goals. Rec continued PT services at x1 week, for x4 visits, in order to return to [facility] strengthening interventions- verbalized understanding to all. May benefit from follow-up with referring MD secondary to now treating lumbar spine with success, however, originally referred for L piriformis syndrome. Consider ideas listed below next visit. STG: (to be met in 6 treatments)  1. ? Pain: Patient will report < 5/10 pain at rest and 7/10 pain with active movement [sit<>stand] in order to improve QOL. - PROGRESSING- 2/10-6/10 over this past week   2. ? ROM: Will demo improved L piriformis flexibility into IR/ER with < 3/10 P in order to improve transfer, sleeping tolerance. - MET   3. ? Strength: Will demo 4+/5 L hip flexion, IR, HS with < 3/10 P in order to better match R and improve stair negotiation, ambulation tolerance. - WILL ASSESS AT LAST VISIT    4. ? Function: Patient will report decreased TTP to L piriformis, glutes in order to indicate decreased inflammation and improved healing of injured site. - MET   5. Patient to be independent with home exercise program as demonstrated by performance with correct form without cues. - MET  LTG: (to be met in 12 treatments)  1. Will sit, stand for x30 minutes without having to shift/reposition and with < 3/10 P for improved QOL.- MET  2. Improve LEFS to 25% limited                  Patient goals: \"I'd like to be able to go for a long walk again; maybe go back to the gym. \"- PROGRESSING    Treatment Plan:  [x] Therapeutic Exercise   20521  [] Iontophoresis: 4 mg/mL Dexamethasone Sodium Phosphate  mAmin  53324   [x] Therapeutic Activity  68658 [] Vasopneumatic cold with compression  07093    [] Gait Training   78553 [] Ultrasound   46179   [] Neuromuscular Re-education  76640 [] Electrical Stimulation Unattended  19100   [x] Manual Therapy  40470 [] Electrical Stimulation Attended  46768   [x] Instruction in HEP  [] Lumbar/Cervical Traction  84249   [x] Aquatic Therapy   84167 [x] Cold/hotpack    [] Massage   78123      [] Dry Needling, 1 or 2 muscles  68625   [] Biofeedback, first 15 minutes   44222  [] Biofeedback, additional 15 minutes   39762 [] Dry Needling, 3 or more muscles  79607     []  Medication allergies reviewed for use of    Dexamethasone Sodium Phosphate 4mg/ml     with iontophoresis treatments. Pt is not allergic. Frequency: 1 x/week for 4 visits    Electronically signed by: Emperatriz Bassett PT    Physician Signature:________________________________Date:__________________  By signing above or cosigning this note, I have reviewed this plan of care and certify a need for medically necessary rehabilitation services.      *PLEASE SIGN ABOVE AND FAX BACK ALL PAGES*

## 2021-08-26 ENCOUNTER — HOSPITAL ENCOUNTER (OUTPATIENT)
Dept: PHYSICAL THERAPY | Facility: CLINIC | Age: 51
Setting detail: THERAPIES SERIES
Discharge: HOME | End: 2021-08-26
Payer: COMMERCIAL

## 2021-08-26 NOTE — FLOWSHEET NOTE
[] [facility]) [facility]  [facility]rapy  [address]    [phone]  F: [phone]   [] [address]  [facility] [address]   [address]  P: [phone]  F: [phone]  [] [address]  [address]  P: [phone]  F: [phone] [x] [address]  P: [phone]  F: [phone]  [] [address]  [address] [facility] [address]   [address]   [place]: [phone]  F: [phone]   [] [place] [address] [facility] [address]  [place]: [phone]   F: [phone]     Physical Therapy Cancel/No Show note    Date: 2021  Patient: Aniyah Johnson  : 1970  MRN: [id number]    Cancels/No Shows to date:     For today's appointment patient:    [x]  Cancelled    [] Rescheduled appointment    [] No-show     Reason given by patient:    []  Patient ill    []  Conflicting appointment    [] No transportation      [] Conflict with work    [] No reason given    [] Weather related    [] COVID-19    [x] Other:      Comments: Therapist error- forgot to take off of the schedule.         [] Next appointment was confirmed    Electronically signed by: Richard Jasmine, PT

## 2021-08-31 ENCOUNTER — HOSPITAL ENCOUNTER (OUTPATIENT)
Dept: PHYSICAL THERAPY | Facility: CLINIC | Age: 51
Setting detail: THERAPIES SERIES
Discharge: HOME | End: 2021-08-31
Payer: COMMERCIAL

## 2021-08-31 PROCEDURE — 97140 MANUAL THERAPY 1/> REGIONS: CPT

## 2021-08-31 PROCEDURE — 97110 THERAPEUTIC EXERCISES: CPT

## 2021-08-31 NOTE — FLOWSHEET NOTE
[] [address]  [address]  [phone]  F: [phone] [] [address]  [facility] [address]   [address]  P: [phone]  F: [phone] [] [address]  [address]  P: [phone]  F: [phone] [x] [address]  P: [phone]  F: [phone] [] [address]  [facility]   [address]   [place]: [phone]  F: [phone]      Physical Therapy Daily Treatment Note    Date:  2021  Patient Name:  Kong Stephen    :  1970  MRN: [id number]  Physician: [address] Brendon Felix- 61 visits   Medical Diagnosis: G57.02- piriformis syndrome of left side                      Rehab Codes: G57.02  Onset date: 2021             Next Dr's appt. : potential next week   Visit# / total visits:      Cancels/No Shows: 0/0     Subjective:    Pain:  [x] Yes  [] No Location: L piriformis/buttock/calf   Pain Rating: (0-10 scale) 2/10- L calf; 0/10- L piriformis  Pain altered Tx:  [x] No  [] Yes  Action: N/A   Comments: \"Butt bone is okay. \" Reports ambulating for fitness x2 since last visit with good tolerance- \"just a little bit in the calf- but I pushed through it- because if I exercise it feels better- I think. \" New onset of L foot dorsal surface N/T- but infrequent. Denies scheduling follow-up with referring MD as of yet. Good compliance, tolerance to current HEP interventions.      Todays Treatment:  Modalities: HP to L lumbar and buttock in prone for x6' at start of treatment session- HELD- 8/10/2021, future appointments; CP to L buttock in prone for x10' post finish of treatment session   Precautions: potential L piriformis and sciatic nerve irritation- symptoms into distal calf; worse with sitting, sleeping, standing   Exercises: relief with PT services for L hip pain- denies for these specific symptoms   Exercise Reps/ Time Weight/ Level Comments   Goal update, PT POC education    COMPLETED- 8/24/2021    Education    Instructed in activity modifications- avoid prolonged; break-up activity into 30 minute sessions- perform HEP interventions throughout, cease if inc pain- verbalized understanding to all    Assessment of SLS balance, squat form    COMPLETED- 8/3/2021    Education- 8/3/2021   Instructed to perform x1 set of HEP prior to taking first steps from bed each AM- verbalized understanding to all    Seated L sciatic nerve glides  x20, 5\" ea   In slumped position- active L knee extension; approach pain, then release; HEP- reviewed    Seated L piriformis str.- IR/ER x15\" ea   HEP             Supine L sciatic nerve glides  x20, 5\" ea   Hip flexion, knee extension, then active ankle DF/PF; HEP- reviewed  Post MT   Supine L piriformis str.- IR/ER 2x30\" ea   HEP- reviewed   Gentle OP upon knee with ER  Post MT   Supine bridge  x20   NEW- 8/3/2021- denies P  Cues for inc glute set              S/L clam, reverse clam- B x20 ea AROM NEW- 8/3/2021- denies P             Modified squat to MOD height mat table  x20    NEW- 8/3/2021- denies P         Repeated motions assessment- 8/10/2021     Inc with RFIS, no change with MILAGROS  No calf pain with prone lay x3'  No calf pain with prone prop x3'   Similar response to 2x10 press-ups onto hands   HEP- reviewed     Press-ups also performed at finish of session- 2x10    Press-ups with PT OP 3 sets  3 differentlumbar levels   Reports relief upon lumbar spine   NEW- 8/12/2021    Prone prop  x3'   At start of session    Press-up with exhale-sag  x10    x10  NEW- 8/24/2021   For inc lumbar extension     Second set at finish, just prior to CP   Prone hip extension- B- ALT 2x10   Denies P  HEP- reviewed   Cues for glute squeeze    Prone hip ER isometric/heel-dig  x20, 3\" ea  Denies P   Prone alt UE/LE lifts  2x10 ea  NEW- 8/12/2021- denies calf P   Supermans 2x10, 5\" ea  NEW- 8/24/2021  Denies calf P this date    Bridges  2x10 ea  NEW- 8/12/2021- denies calf P    Progressed to performing upon SB- LEs resting upon SB- 8/31/2021    HEP education    COMPLETED- 8/10/2021   Emphasized at least x3 per day while on vacation; must be repeated in order to determine true effect upon L-side symptoms; if suspect lumbar spine involvement, may benefit from formal assessment by referring MD/xray soon- verbalized understanding to all          TM ambulation  x6'  1.5 to 1. 8mph B UE A    Slight calf symptoms    Standing lumbar extensions- OP into waist-height mat table  2x10                   2x10   Reports only slight calf symptoms, but inc LBP- centralization  Reports preference for this intervention  Repeated at finish of new interventions  Instructed to perform if calf pain or symptoms intensify during ambulation for fitness          Quadruped T.A. 2x10   NEW- 8/31/2021- denies calf symptoms    Quadruped T.A. alt UE lifts 2x10 ea  NEW- 8/31/2021- denies    Cow only  2x10 ea  NEW- 8/31/2021- denies          Modified squats to chair  2x10 ea  NEW- 8/31/2021- denies   Tap butt to chair                                Other: BOLD is completed. Consider L SIJD assessment in future- PRN. Pt reports R LE longer than L LE.     MT: hypervolt with sphere attachment on lowest setting to L piriformis/glutes- x6'; then followed with 1/2 FR massage [hypervolt] with MOD OP to L hamstrings/sciatic nerve- x6'- reports relief with all. Treatment Charges: Mins Units   [x]  Modalities- CP 10 -   [x]  Ther Exercise 34 2   [x]  Manual Therapy 10 1   []  Ther Activities     []  Aquatics     []  Vasocompression     []  Other     Total Treatment time 54 3     [x6' on TM]     Assessment: [x] Progressing toward goals.  Pt presents with only slight L calf symptoms, and good tolerance to last visit. Reports ambulating for fitness- x2 episodes of x2 miles each since last visit- with slight inc in L calf symptoms, and reports pushing through her symptoms- educated as detailed above. Attempted TM ambulation today- implemented standing lumbar extensions against mat table immediately post in order to reduce L calf symptoms- successful. Able to progress to performing quadruped T.A. interventions and modified squats without inc symptoms. Finished with prone mat table interventions- denies symptoms with supermans this date. MT and CP for pain management. Consider ideas listed below next visit. [] No change. [] Other:  [x] Patient would continue to benefit from skilled physical therapy services in order to: inc L hip AROM, strength; and overall improve tolerance for prolonged sitting, standing, sleeping, and ambulation. STG: (to be met in 6 treatments)  1. ? Pain: Patient will report < 5/10 pain at rest and 7/10 pain with active movement [sit<>stand] in order to improve QOL. - PROGRESSING- 2/10-6/10 over this past week   2. ? ROM: Will demo improved L piriformis flexibility into IR/ER with < 3/10 P in order to improve transfer, sleeping tolerance. - MET   3. ? Strength: Will demo 4+/5 L hip flexion, IR, HS with < 3/10 P in order to better match R and improve stair negotiation, ambulation tolerance. - WILL ASSESS AT LAST VISIT    4. ? Function: Patient will report decreased TTP to L piriformis, glutes in order to indicate decreased inflammation and improved healing of injured site. - MET   5. Patient to be independent with home exercise program as demonstrated by performance with correct form without cues. - MET  LTG: (to be met in 12 treatments)  1. Will sit, stand for x30 minutes without having to shift/reposition and with < 3/10 P for improved QOL.- MET  2. Improve LEFS to 25% limited                  Patient goals: \"I'd like to be able to go for a long walk again; maybe go back to the gym. \"- PROGRESSING    Pt. Education:  [x] Yes  [] No  [x] Reviewed Prior HEP/Ed  Method of Education: [] Verbal  [] Demo  [] Written  Comprehension of Education:  [] Verbalizes understanding. [] Demonstrates understanding. [] Needs review. [x] Demonstrates/verbalizes HEP/Ed previously given. 8/10/2021- See grid above for details. 8/31/2021- See grid above for details. Plan: [x] Continue current frequency toward long and short term goals. [x] Specific Instructions for subsequent treatments: Follow-up with pt regarding tolerance to last, progressed treatment session- continue if proven beneficial or modify PRN. Issue new, updated HEP handout. Assess for L SIJD- including leg length discrepancy- next visit- implement MET if appropriate.        Time In: 8:05AM        Time Out: 9:05AM    Electronically signed by:  Sherren Skene, PT

## 2021-09-02 ENCOUNTER — HOSPITAL ENCOUNTER (OUTPATIENT)
Dept: PHYSICAL THERAPY | Facility: CLINIC | Age: 51
Setting detail: THERAPIES SERIES
Discharge: HOME | End: 2021-09-02
Payer: COMMERCIAL

## 2021-09-02 PROCEDURE — 97140 MANUAL THERAPY 1/> REGIONS: CPT

## 2021-09-02 PROCEDURE — 97110 THERAPEUTIC EXERCISES: CPT

## 2021-09-02 NOTE — FLOWSHEET NOTE
[] [facility] [facility]  [address]  [phone]  F: [phone] [] [address]  [facility]   [address]  P: [phone]  F: [phone] [] [place]  [address] [facility]  P: [phone]  F: [phone] [x] [address]  P: [phone]  F: [phone] [] [address]  [facility]   [address]   [place]: [phone]  F: [phone]      Physical Therapy Daily Treatment Note    Date:  2021  Patient Name:  Treva Mayo    :  1970  MRN: [id number]  [facility]- [facility]   Medical Diagnosis: G57.02- piriformis syndrome of left side                      Rehab Codes: G57.02  Onset date: 2021             Next Dr's appt. : potential next week   Visit# / total visits:      Cancels/No Shows: 0/0     Subjective:    Pain:  [x] Yes  [] No Location: L piriformis/buttock/calf   Pain Rating: (0-10 scale) 3/10- L calf; 3/10- L piriformis  Pain altered Tx:  [x] No  [] Yes  Action: N/A   Comments: \"It's hurting- walked 1.5 hours last night- but took breaks when my calf started hurting. \" Also reports performing supine resisted hip ABD/ADD with bands yesterday AM with resulting inc P- educated per below. Denies scheduling follow-up with referring MD as of yet. Pt approximately x6' late to treatment appointment.      Todays Treatment:  Modalities: HP to L lumbar and buttock in prone for x6' at start of treatment session- HELD- 8/10/2021, future appointments; CP to L buttock in prone for x10' post finish of treatment session   Precautions: potential L piriformis and sciatic nerve irritation- symptoms into distal calf; worse with sitting, sleeping, standing   Exercises: relief with PT services for L hip pain- denies for these specific symptoms   Exercise Reps/ Time Weight/ Level Comments   HEP education    COMPLETED- 9/2/2021    Goal update, PT POC education    COMPLETED- 8/24/2021    Education    Instructed in activity modifications- avoid prolonged; break-up activity into 30 minute sessions- perform HEP interventions throughout, cease if inc pain- verbalized understanding to all    Assessment of SLS balance, squat form    COMPLETED- 8/3/2021    Education- 8/3/2021   Instructed to perform x1 set of HEP prior to taking first steps from bed each AM- verbalized understanding to all    Seated L sciatic nerve glides  x20, 5\" ea   In slumped position- active L knee extension; approach pain, then release; HEP- reviewed    Seated L piriformis str.- IR/ER x15\" ea   HEP             Supine L sciatic nerve glides  x20, 5\" ea   Hip flexion, knee extension, then active ankle DF/PF; HEP- reviewed  Post MT   Supine L piriformis str.- IR/ER 2x30\" ea   HEP- reviewed   Gentle OP upon knee with ER  Post MT   Supine bridge  x20   NEW- 8/3/2021- denies P  Cues for inc glute set              S/L clam, reverse clam- B x20 ea AROM NEW- 8/3/2021- denies P             Modified squat to MOD height mat table  x20    NEW- 8/3/2021- denies P         Repeated motions assessment- 8/10/2021     Inc with RFIS, no change with MILAGROS  No calf pain with prone lay x3'  No calf pain with prone prop x3'   Similar response to 2x10 press-ups onto hands   HEP- reviewed     Press-ups also performed at finish of session- 2x10    Press-ups with PT OP 3 sets  3 differentlumbar levels   Reports relief upon lumbar spine   NEW- 8/12/2021    Prone prop  x3'   At start of session    Press-up with exhale-sag  x10    x10  NEW- 8/24/2021   For inc lumbar extension     Second set at finish, just prior to CP    At start of session today    Prone hip extension- B- ALT 2x10   Denies P  HEP- reviewed   Cues for glute squeeze    Prone hip ER isometric/heel-dig  x20, 3\" ea  Denies P   Prone alt UE/LE lifts  2x10 ea  NEW- 8/12/2021- denies calf P   Supermans 2x10, 5\" ea  NEW- 8/24/2021  Denies calf P this date  HEP    Bridges  2x10 ea  NEW- 8/12/2021- denies calf P    Progressed to performing upon SB- LEs resting upon SB- 8/31/2021   HEP   HEP education    COMPLETED- 8/10/2021   Emphasized at least x3 per day while on vacation; must be repeated in order to determine true effect upon L-side symptoms; if suspect lumbar spine involvement, may benefit from formal assessment by referring MD/xray soon- verbalized understanding to all          TM ambulation  x6'  1.5 to 1. 8mph B UE A    Slight calf symptoms     MAY RESUME NEXT VISIT   Standing lumbar extensions- OP into waist-height mat table  2x10                   2x10   Reports only slight calf symptoms, but inc LBP- centralization  Reports preference for this intervention  Repeated at finish of new interventions  Instructed to perform if calf pain or symptoms intensify during ambulation for fitness    HEP          Quadruped T.A. 2x10   NEW- 8/31/2021- denies calf symptoms   HEP   Quadruped T.A. alt UE lifts 2x10 ea  NEW- 8/31/2021- denies   HEP   Cow only  2x10 ea  NEW- 8/31/2021- denies   HEP         Modified forearm planks on elevated mat table  3x30\" ea  NEW- 9/2/2021- denies   HEP   Modified squats to chair  2x10 ea  NEW- 8/31/2021- denies   Tap butt to chair   HEP                               Other: BOLD is completed. Consider L SIJD assessment in future- PRN. Pt reports R LE longer than L LE.     MT: hypervolt with sphere attachment on lowest setting to L piriformis/glutes- x6'; then followed with 1/2 FR massage [hypervolt] with MOD OP to L hamstrings/sciatic nerve- x6'- reports relief with all.      Treatment Charges: Mins Units   [x]  Modalities- CP 10 -   [x]  Ther Exercise 29 2   [x]  Manual Therapy 10 1   []  Ther Activities     []  Aquatics     []  Vasocompression     [] Other     Total Treatment time 49 3     Assessment: [] Progressing toward goals. [] No change. [x] Other: Pt presents with MIN L buttock into calf symptoms, but reports amb for fitness 1.5 hours last PM, as well as performing supine resisted hip ABD against bands while at home with immediate inc in pain. Therefore, instructed to not perform said intervention, and to instead perform interventions from last visit- will issue new, updated HEP handout. Held TM ambulation this date secondary to inc symptoms- proceeded with prone interventions with resolution of L buttock, calf symptoms. Able to progress to performing modified forearm planks on elevated mat table without issue. Continued MT and CP for pain management. Issued new, updated HEP handout- will follow-up regarding tolerance next visit. [x] Patient would continue to benefit from skilled physical therapy services in order to: inc L hip AROM, strength; and overall improve tolerance for prolonged sitting, standing, sleeping, and ambulation. STG: (to be met in 6 treatments)  1. ? Pain: Patient will report < 5/10 pain at rest and 7/10 pain with active movement [sit<>stand] in order to improve QOL. - PROGRESSING- 2/10-6/10 over this past week   2. ? ROM: Will demo improved L piriformis flexibility into IR/ER with < 3/10 P in order to improve transfer, sleeping tolerance. - MET   3. ? Strength: Will demo 4+/5 L hip flexion, IR, HS with < 3/10 P in order to better match R and improve stair negotiation, ambulation tolerance. - WILL ASSESS AT LAST VISIT    4. ? Function: Patient will report decreased TTP to L piriformis, glutes in order to indicate decreased inflammation and improved healing of injured site. - MET   5. Patient to be independent with home exercise program as demonstrated by performance with correct form without cues. - MET  LTG: (to be met in 12 treatments)  1.  Will sit, stand for x30 minutes without having to shift/reposition and with < 3/10 P for improved QOL.- MET  2. Improve LEFS to 25% limited                  Patient goals: \"I'd like to be able to go for a long walk again; maybe go back to the gym. \"- PROGRESSING    Pt. Education:  [x] Yes  [] No  [] Reviewed Prior HEP/Ed  Method of Education: [x] Verbal  [x] Demo  [x] Written  Comprehension of Education:  [x] Verbalizes understanding. [] Demonstrates understanding. [x] Needs review. [] Demonstrates/verbalizes HEP/Ed previously given. 8/10/2021- See grid above for details. 8/31/2021- See grid above for details. 9/2/2021- See grid above for details. Plan: [x] Continue current frequency toward long and short term goals. [x] Specific Instructions for subsequent treatments: Follow-up with pt regarding tolerance to new, updated HEP handout- continue if proven beneficial or modify PRN. May resume TM ambulation if pt presents with trace to no L calf symptoms. Assess for L SIJD- including leg length discrepancy- next visit- implement MET if appropriate.        Time In: 9:07AM        Time Out: 9:56AM    Electronically signed by:  Isabella Boston PT

## 2021-09-09 ENCOUNTER — HOSPITAL ENCOUNTER (OUTPATIENT)
Dept: PHYSICAL THERAPY | Facility: CLINIC | Age: 51
Setting detail: THERAPIES SERIES
Discharge: HOME | End: 2021-09-09
Payer: COMMERCIAL

## 2021-09-09 PROCEDURE — 97110 THERAPEUTIC EXERCISES: CPT

## 2021-09-09 PROCEDURE — 97140 MANUAL THERAPY 1/> REGIONS: CPT

## 2021-09-09 NOTE — FLOWSHEET NOTE
[] [facility]  [address]  [phone]  F: [phone] [] [address]  [facility] [address]   [address]  P: [phone]  F: [phone] [] [place]  [address] [facility]  P: [phone]  F: [phone] [x] [address]  P: [phone]  F: [phone] [] [address]  [facility]   [address]   [place]: [phone]  F: [phone]      Physical Therapy Daily Treatment Note    Date:  2021  Patient Name:  Kong Stephen    :  1970  MRN: [id number]  [facility] [place]- [facility]   Medical Diagnosis: G57.02- piriformis syndrome of left side                      Rehab Codes: G57.02  Onset date: 2021             Next Dr's appt. : potential next week   Visit# / total visits:      Cancels/No Shows: 0/0     Subjective:    Pain:  [x] Yes  [] No Location: L piriformis/buttock/calf   Pain Rating: (0-10 scale) 4/10- L calf; 4/10- L piriformis  Pain altered Tx:  [x] No  [] Yes  Action: N/A   Comments: Inc pain and symptoms today, which began on . Reports ambulating x3 miles with  on Saturday, as well as putting her dog down- experiencing sig stress. Reports only performing new, updated HEP handout x1 since last visit- not likely to have contributed to inc symptoms. Reports visiting referring MD on Monday- instructed to undergo lumbar spine and L hip xray; as well as utilize ibuprofen, muscle relaxer for management of symptoms. Pt denies utilizing these medications thus far. Also denies utilizing HP, CP over the weekend. Awaiting telephone call from referring MD in order to schedule lumbar spine MRI.      Pt approximately x6' late to treatment appointment.      Todays Treatment:  Modalities: HP to L lumbar and buttock in prone for x6' at start of treatment session- HELD- 8/10/2021, future appointments; CP to L buttock in prone for x10' post finish of treatment session   Precautions: potential L piriformis and sciatic nerve irritation- symptoms into distal calf; worse with sitting, sleeping, standing   Exercises: relief with PT services for L hip pain- denies for these specific symptoms   Exercise Reps/ Time Weight/ Level Comments   HEP education    COMPLETED- 9/2/2021    Goal update, PT POC education    COMPLETED- 8/24/2021    Education    Instructed in activity modifications- avoid prolonged; break-up activity into 30 minute sessions- perform HEP interventions throughout, cease if inc pain- verbalized understanding to all    Assessment of SLS balance, squat form    COMPLETED- 8/3/2021    Education- 8/3/2021   Instructed to perform x1 set of HEP prior to taking first steps from bed each AM- verbalized understanding to all    Seated L sciatic nerve glides  x20, 5\" ea   In slumped position- active L knee extension; approach pain, then release; HEP- reviewed    Seated L piriformis str.- IR/ER x15\" ea   HEP             Supine L sciatic nerve glides  x20, 5\" ea   Hip flexion, knee extension, then active ankle DF/PF; HEP- reviewed  Post MT   Supine L piriformis str.- IR/ER 2x30\" ea   HEP- reviewed   Gentle OP upon knee with ER  Post MT   Supine bridge  x20   NEW- 8/3/2021- denies P  Cues for inc glute set              S/L clam, reverse clam- B x20 ea AROM NEW- 8/3/2021- denies P             Modified squat to MOD height mat table  x20    NEW- 8/3/2021- denies P         Repeated motions assessment- 8/10/2021     Inc with RFIS, no change with MILAGROS  No calf pain with prone lay x3'  No calf pain with prone prop x3'   Similar response to 2x10 press-ups onto hands   HEP- reviewed     Press-ups also performed at finish of session- 2x10    Press-ups with PT OP 3 sets  3 differentlumbar levels   Reports relief upon lumbar spine   NEW- 8/12/2021    Prone prop  x3'   At start of session    Press-up with exhale-sag  x10    x10    x10   NEW- 8/24/2021   For inc lumbar extension     Second set at finish, just prior to CP    At start and finish of session today    Prone hip extension- B- ALT 2x10   Denies P  HEP- reviewed   Cues for glute squeeze    Prone hip ER isometric/heel-dig  x20, 3\" ea  Denies P   Prone alt UE/LE lifts  2x10 ea  NEW- 8/12/2021- denies calf P   Supermans 2x10, 5\" ea  NEW- 8/24/2021  Denies calf P this date  HEP- reviewed    Bridges  2x10 ea  NEW- 8/12/2021- denies calf P    Progressed to performing upon SB- LEs resting upon SB- 8/31/2021   HEP- reviewed    HEP education    COMPLETED- 8/10/2021   Emphasized at least x3 per day while on vacation; must be repeated in order to determine true effect upon L-side symptoms; if suspect lumbar spine involvement, may benefit from formal assessment by referring MD/xray soon- verbalized understanding to all          TM ambulation  x6'  1.5 to 1. 8mph B UE A    Slight calf symptoms     MAY RESUME NEXT VISIT   Standing lumbar extensions- OP into waist-height mat table  2x10                   2x10   Reports only slight calf symptoms, but inc LBP- centralization  Reports preference for this intervention  Repeated at finish of new interventions  Instructed to perform if calf pain or symptoms intensify during ambulation for fitness    HEP          Quadruped T.A. 2x10   NEW- 8/31/2021- denies calf symptoms   HEP- reviewed    Quadruped T.A. alt UE lifts 2x10 ea  NEW- 8/31/2021- denies   HEP- reviewed    Cow only  2x10 ea  NEW- 8/31/2021- denies   HEP- reviewed          Modified forearm planks on elevated mat table  3x30\" ea  NEW- 9/2/2021- denies   HEP   Modified squats to chair  2x10 ea  NEW- 8/31/2021- denies   Tap butt to chair   HEP                               Other: BOLD is completed.  Consider L SIJD assessment in future- PRN. Pt reports R LE longer than L LE.     MT: hypervolt with sphere attachment on lowest setting to L piriformis/glutes- x6'; then followed with 1/2 FR massage [hypervolt] with MOD OP to L hamstrings/sciatic nerve- x6'- reports relief with all. Treatment Charges: Mins Units   [x]  Modalities- CP 10 -   [x]  Ther Exercise 29 2   [x]  Manual Therapy 10 1   []  Ther Activities     []  Aquatics     []  Vasocompression     []  Other     Total Treatment time 49 3     Assessment: [] Progressing toward goals. [] No change. [x] Other: Pt presents with inc L LE symptoms, and indicates flare-up of symptoms beginning this past Sunday. However, also reports 3 mile ambulation this past Saturday and extensive sitting/ambulating secondary to having to put down her dog- which all could be contributing to her inc pain and symptoms. Reports visiting referring MD on Monday- instructed to undergo lumbar spine/L hip xray and utilize medication for pain management- pt declines use of these medications. Pt also denies utilizing CP, HP during flare-up- despite previous instructions by primary PT. Therefore, held TM ambulation and all WB interventions today- focused upon prone interventions for lumbar decompression, and in turn, relief of L LE/calf symptoms. Continued MT and CP- reports relief with all. Trace L calf symptoms at finish. Emphasized compliance with prone HEP while at home and use of CP- verbalized understanding to all- will follow-up next visit. [x] Patient would continue to benefit from skilled physical therapy services in order to: inc L hip AROM, strength; and overall improve tolerance for prolonged sitting, standing, sleeping, and ambulation. STG: (to be met in 6 treatments)  1. ? Pain: Patient will report < 5/10 pain at rest and 7/10 pain with active movement [sit<>stand] in order to improve QOL. - PROGRESSING- 2/10-6/10 over this past week   2. ? ROM: Will demo improved L piriformis flexibility into IR/ER with < 3/10 P in order to improve transfer, sleeping tolerance. - MET   3. ? Strength: Will demo 4+/5 L hip flexion, IR, HS with < 3/10 P in order to better match R and improve stair negotiation, ambulation tolerance. - WILL ASSESS AT LAST VISIT    4. ? Function: Patient will report decreased TTP to L piriformis, glutes in order to indicate decreased inflammation and improved healing of injured site. - MET   5. Patient to be independent with home exercise program as demonstrated by performance with correct form without cues. - MET  LTG: (to be met in 12 treatments)  1. Will sit, stand for x30 minutes without having to shift/reposition and with < 3/10 P for improved QOL.- MET  2. Improve LEFS to 25% limited                  Patient goals: \"I'd like to be able to go for a long walk again; maybe go back to the gym. \"- PROGRESSING    Pt. Education:  [x] Yes  [] No  [x] Reviewed Prior HEP/Ed  Method of Education: [] Verbal  [] Demo  [] Written  Comprehension of Education:  [] Verbalizes understanding. [] Demonstrates understanding. [] Needs review. [x] Demonstrates/verbalizes HEP/Ed previously given. 8/10/2021- See grid above for details. 8/31/2021- See grid above for details. 9/2/2021- See grid above for details. Plan: [x] Continue current frequency toward long and short term goals. [x] Specific Instructions for subsequent treatments: Follow-up with pt regarding tolerance to modified/regressed treatment session- as well as communication with referring MD- adjust PT POC PRN. May resume TM ambulation, WB interventions if pt presents with trace to no L calf symptoms. Assess for L SIJD- including leg length discrepancy- next visit- implement MET if appropriate.        Time In: 10:06AM        Time Out: 10:55AM    Electronically signed by:  Ludmila Kwon PT

## 2021-09-16 ENCOUNTER — HOSPITAL ENCOUNTER (OUTPATIENT)
Dept: PHYSICAL THERAPY | Facility: CLINIC | Age: 51
Setting detail: THERAPIES SERIES
Discharge: HOME | End: 2021-09-16
Payer: COMMERCIAL

## 2021-09-16 PROCEDURE — 97110 THERAPEUTIC EXERCISES: CPT

## 2021-09-16 PROCEDURE — 97140 MANUAL THERAPY 1/> REGIONS: CPT

## 2021-09-16 NOTE — FLOWSHEET NOTE
[] [facility]  [address]  [phone]  F: [phone] [] [email or web address]  [facility] 36   [address]  P: [phone]  F: [phone] [] [address]  P: [phone]  F: [phone] [x] [address]  P: [phone]  F: [phone] [] [address]  [facility]   [address]   [place]: [phone]  F: [phone]      Physical Therapy Daily Treatment Note    Date:  2021  Patient Name:  Grazyna Mckenzie    :  1970  MRN: [id number]  Physician: Estelle Reidvd.: Michi Avendaño 150- 60 visits   Medical Diagnosis: G57.02- piriformis syndrome of left side                      Rehab Codes: G57.02  Onset date: 2021             Next Dr's appt. : potential next week   Visit# / total visits:      Cancels/No Shows: 0/0     Subjective:    Pain:  [x] Yes  [] No Location: L piriformis/buttock/calf   Pain Rating: (0-10 scale) 2/10- L calf; 2/10- L piriformis  Pain altered Tx:  [x] No  [] Yes  Action: N/A   Comments:    Reports flare-up in LBP, once again, this past  when transferring from floor post cleaning a rug. Reports cleaning rug in a forward bent position- hands and knees. Reports unable to ambulate for x2 days- eventually utilized cane for relief. Telephoned referring MD- revealed normal results of xray. Reports resolution of L LBP into today, however, lingering L piriformis, calf pain and symptoms. Educated per below- agreeable to discharge today from PT services.      Todays Treatment:  Modalities: HP to L lumbar and buttock in prone for x6' at start of treatment session- HELD- 8/10/2021, future appointments; CP to L buttock in prone for x10' post finish of treatment session   Precautions: potential L piriformis and sciatic nerve irritation- symptoms into distal calf; worse with sitting, sleeping, standing   Exercises: relief with PT services for L hip pain- denies for these specific symptoms   Exercise Reps/ Time Weight/ Level Comments   HEP, PT POC education    COMPLETED- 9/16/2021     Rec discharge from PT services- follow-up with referring MD secondary to continued flare-ups in LBP/L LE symptoms; dec L LE symptoms, though not 100% resolution; chronic issue; may require further diagnostics, pain management- verbalized understanding to all    HEP education    COMPLETED- 9/2/2021    Goal update, PT POC education    COMPLETED- 8/24/2021    Education    Instructed in activity modifications- avoid prolonged; break-up activity into 30 minute sessions- perform HEP interventions throughout, cease if inc pain- verbalized understanding to all    Assessment of SLS balance, squat form    COMPLETED- 8/3/2021    Education- 8/3/2021   Instructed to perform x1 set of HEP prior to taking first steps from bed each AM- verbalized understanding to all    Seated L sciatic nerve glides  x20, 5\" ea   In slumped position- active L knee extension; approach pain, then release; HEP- reviewed    Seated L piriformis str.- IR/ER x15\" ea   HEP             Supine L sciatic nerve glides  x20, 5\" ea   Hip flexion, knee extension, then active ankle DF/PF; HEP- reviewed  Post MT   Supine L piriformis str.- IR/ER 2x30\" ea   HEP- reviewed   Gentle OP upon knee with ER  Post MT   Supine bridge  x20   NEW- 8/3/2021- denies P  Cues for inc glute set              S/L clam, reverse clam- B x20 ea AROM NEW- 8/3/2021- denies P             Modified squat to MOD height mat table  x20    NEW- 8/3/2021- denies P         Repeated motions assessment- 8/10/2021     Inc with RFIS, no change with MILAGROS  No calf pain with prone lay x3'  No calf pain with prone prop x3'   Similar response to 2x10 press-ups onto hands HEP- reviewed     Press-ups also performed at finish of session- 2x10    Press-ups with PT OP 3 sets  3 differentlumbar levels   Reports relief upon lumbar spine   NEW- 8/12/2021    Prone prop  x3'   At start of session    Press-up with exhale-sag  x10    x10    x10   NEW- 8/24/2021   For inc lumbar extension     Second set at finish, just prior to CP    At start and finish of session today    Prone hip extension- B- ALT 2x10   Denies P  HEP- reviewed   Cues for glute squeeze    Prone hip ER isometric/heel-dig  x20, 3\" ea  Denies P   Prone alt UE/LE lifts  2x10 ea  NEW- 8/12/2021- denies calf P   Supermans 2x10, 5\" ea  NEW- 8/24/2021  Denies calf P this date  HEP- reviewed    Bridges  2x10 ea  NEW- 8/12/2021- denies calf P    Progressed to performing upon SB- LEs resting upon SB- 8/31/2021   HEP- reviewed    HEP education    COMPLETED- 8/10/2021   Emphasized at least x3 per day while on vacation; must be repeated in order to determine true effect upon L-side symptoms; if suspect lumbar spine involvement, may benefit from formal assessment by referring MD/xray soon- verbalized understanding to all          TM ambulation  x6'  1.5 to 1. 8mph B UE A    Slight calf symptoms     MAY RESUME NEXT VISIT   Standing lumbar extensions- OP into waist-height mat table  2x10                   2x10   Reports only slight calf symptoms, but inc LBP- centralization  Reports preference for this intervention  Repeated at finish of new interventions  Instructed to perform if calf pain or symptoms intensify during ambulation for fitness    HEP          Quadruped T.A. 2x10   NEW- 8/31/2021- denies calf symptoms   HEP- reviewed    Quadruped T.A. alt UE lifts 2x10 ea  NEW- 8/31/2021- denies   HEP- reviewed    Cow only  2x10 ea  NEW- 8/31/2021- denies   HEP- reviewed          Modified forearm planks on elevated mat table  3x30\" ea  NEW- 9/2/2021- denies   HEP   Modified squats to chair  2x10 ea  NEW- 8/31/2021- denies   Tap butt to chair HEP                               Other: BOLD is completed. Consider L SIJD assessment in future- PRN. Pt reports R LE longer than L LE.     MT: hypervolt with sphere attachment on lowest setting to L piriformis/glutes- x6'; then followed with 1/2 FR massage [hypervolt] with MOD OP to L hamstrings/sciatic nerve- x6'- reports relief with all. Treatment Charges: Mins Units   [x]  Modalities- CP 10 -   [x]  Ther Exercise 29 2   [x]  Manual Therapy 15 1   []  Ther Activities     []  Aquatics     []  Vasocompression     []  Other     Total Treatment time 54 3     Assessment: [] Progressing toward goals. [] No change.  x Other: Pt presents with MIN L LE symptoms, however, flare-up in LBP/L LE symptoms, once again, over this past weekend. This is the second flare-up episode since the start of PT services. Reports symptoms were sig and sig limited her walking abilities- required a cane for x2 days. However, recent xray is normal. Therefore, continued prone progression in order to provide some relief. Educated pt extensively per grid above- rec discharge from PT services and follow-up with referring MD secondary to continued flare-ups in LBP/L LE symptoms; dec L LE symptoms, but not 100% resolution; chronic issue; and may require further diagnostics, pain management- verbalized understanding to all. Progression with PT services limited secondary to pt performing activities at home in lumbar flexion, despite being educated that flexion would inc symptoms and extension would dec symptoms. No further PT services recommended at this time. [] Patient would continue to benefit from skilled physical therapy services in order to: inc L hip AROM, strength; and overall improve tolerance for prolonged sitting, standing, sleeping, and ambulation. STG: (to be met in 6 treatments)  1. ? Pain: Patient will report < 5/10 pain at rest and 7/10 pain with active movement [sit<>stand] in order to improve QOL. - PROGRESSING- 2/10-6/10 over this past week   2. ? ROM: Will demo improved L piriformis flexibility into IR/ER with < 3/10 P in order to improve transfer, sleeping tolerance. - MET   3. ? Strength: Will demo 4+/5 L hip flexion, IR, HS with < 3/10 P in order to better match R and improve stair negotiation, ambulation tolerance. - WILL ASSESS AT LAST VISIT    4. ? Function: Patient will report decreased TTP to L piriformis, glutes in order to indicate decreased inflammation and improved healing of injured site. - MET   5. Patient to be independent with home exercise program as demonstrated by performance with correct form without cues. - MET  LTG: (to be met in 12 treatments)  1. Will sit, stand for x30 minutes without having to shift/reposition and with < 3/10 P for improved QOL.- MET  2. Improve LEFS to 25% limited                  Patient goals: \"I'd like to be able to go for a long walk again; maybe go back to the gym. \"- PROGRESSING    Pt. Education:  [x] Yes  [] No  [x] Reviewed Prior HEP/Ed  Method of Education: [] Verbal  [] Demo  [] Written  Comprehension of Education:  [] Verbalizes understanding. [] Demonstrates understanding. [] Needs review. [x] Demonstrates/verbalizes HEP/Ed previously given. 8/10/2021- See grid above for details. 8/31/2021- See grid above for details. 9/2/2021- See grid above for details. 9/16/2021- See grid above for details. Plan: [x] Continue current frequency toward long and short term goals. [x] Specific Instructions for subsequent treatments: Discharge today from PT services.        Time In: 10:06AM        Time Out: 11AM    Electronically signed by:  Peyton Ball, PT

## 2021-09-16 NOTE — DISCHARGE SUMMARY
[] [facility].  [address]  [phone]  F: [phone] [] [address]  [facility] 36   [address]  P: [phone]  F: [phone] [] [address]  [address] [facility]  P: [phone]  F: [phone] [x] [address]  P: [phone]  F: [phone] [] [address]  [facility]   [address]   [place]: [phone]  F: [phone]      Physical Therapy Discharge Note    Date: 2021      Patient: Ankita Bethea  : 1970  MRN: [id number]    Physician: Lizzeth Salvador                    Insurance: [email or web address]- 61 visits   Medical Diagnosis: G57.02- piriformis syndrome of left side                      Rehab Codes: G57.02  Onset date: 2021             Next Dr's appt. : potential next week   Visit# / total visits:                                 Cancels/No Shows: 0/0  Date of initial visit: 8/3/2021               Date of final visit: 2021     Subjective:    Pain:  [x]? Yes  []? No   Location: L piriformis/buttock/calf         Pain Rating: (0-10 scale) 2/10- L calf; 2/10- L piriformis  Pain altered Tx:  [x]? No  []? Yes  Action: N/A   Comments:     Reports flare-up in LBP, once again, this past  when transferring from floor post cleaning a rug. Reports cleaning rug in a forward bent position- hands and knees. Reports unable to ambulate for x2 days- eventually utilized cane for relief. Telephoned referring MD- revealed normal results of xray. Reports resolution of L LBP into today, however, lingering L piriformis, calf pain and symptoms. Educated per below- agreeable to discharge today from PT services.      Assessment:  Pt presents with MIN L LE symptoms, however, flare-up in LBP/L LE symptoms, once again, over this past weekend. This is the second flare-up episode since the start of PT services. Reports symptoms were sig and sig limited her walking abilities- required a cane for x2 days. However, recent xray is normal. Therefore, continued prone progression in order to provide some relief. Educated pt extensively per grid above- rec discharge from PT services and follow-up with referring MD secondary to continued flare-ups in LBP/L LE symptoms; dec L LE symptoms, but not 100% resolution; chronic issue; and may require further diagnostics, pain management- verbalized understanding to all. Progression with PT services limited secondary to pt performing activities at home in lumbar flexion, despite being educated that flexion would inc symptoms and extension would dec symptoms. No further PT services recommended at this time. STG: (to be met in 6 treatments)  1. ? Pain: Patient will report < 5/10 pain at rest and 7/10 pain with active movement [sit<>stand] in order to improve QOL. - PROGRESSING- 2/10-6/10 over this past week   2. ? ROM: Will demo improved L piriformis flexibility into IR/ER with < 3/10 P in order to improve transfer, sleeping tolerance. - MET   3. ? Strength: Will demo 4+/5 L hip flexion, IR, HS with < 3/10 P in order to better match R and improve stair negotiation, ambulation tolerance. - WILL ASSESS AT LAST VISIT    4. ? Function: Patient will report decreased TTP to L piriformis, glutes in order to indicate decreased inflammation and improved healing of injured site. - MET   5. Patient to be independent with home exercise program as demonstrated by performance with correct form without cues. - MET  LTG: (to be met in 12 treatments)  1. Will sit, stand for x30 minutes without having to shift/reposition and with < 3/10 P for improved QOL.- MET  2.  Improve LEFS to 25% limited                  Patient goals: \"I'd like to be able to go for a long walk again; maybe go back to the gym.\"- PROGRESSING    Treatment to Date:  [x] Therapeutic Exercise    [x] Modalities:  [x] Therapeutic Activity    [] Ultrasound  [] Electrical Stimulation  [] Gait Training     [] Massage       [] Lumbar/Cervical Traction  [] Neuromuscular Re-education [x] Cold/hotpack [] Iontophoresis: 4 mg/mL  [x] Instruction in Home Exercise Program                     Dexamethasone Sodium  [x] Manual Therapy             Phosphate 40-80 mAmin  [] Aquatic Therapy                   [] Vasocompression/    [] Other:             Game Ready    Discharge Status:     [] Pt recovered from conditions. Treatment goals were met. [] Pt received maximum benefit. No further therapy indicated at this time. [] Pt to continue exercise/home instructions independently. [] Therapy interrupted due to:    [] Pt has 2 or more no shows/cancels, is discontinued per our policy. [] Pt has completed prescribed number of treatment sessions. [x] Other: SEE ABOVE. Electronically signed by Vandana Bill PT on 9/16/2021 at 11:17 AM      If you have any questions or concerns, please don't hesitate to call.   Thank you for your referral.

## 2021-09-23 ENCOUNTER — APPOINTMENT (OUTPATIENT)
Dept: PHYSICAL THERAPY | Facility: CLINIC | Age: 51
End: 2021-09-23
Payer: COMMERCIAL

## 2021-11-03 ENCOUNTER — HOSPITAL ENCOUNTER (OUTPATIENT)
Dept: PHYSICAL THERAPY | Facility: CLINIC | Age: 51
Setting detail: THERAPIES SERIES
Discharge: HOME | End: 2021-11-03
Payer: COMMERCIAL

## 2021-11-03 NOTE — FLOWSHEET NOTE
[] [facility]) [facility] &  Therapy  [address]    [phone]  F: [phone]   [] [address]  [facility]   [address]  P: [phone]  F: [phone]  [] [place]  [address]  P: [phone]  F: [phone] [x] [address]  P: [phone]  F: [phone]  [] [address]  [address] [facility] [address]   [address]   [place]: [phone]  F: [phone]   [] [facility]  [address] [facility] [address]  [place]: [phone]   F: [phone]     Physical Therapy Cancel/No Show note    Date: 11/3/2021  Patient: Shoaib Alonzo  : 1970  MRN: [id number]    Cancels/No Shows to date: 2 cx / 0 ns  For today's appointment patient:    [x]  Cancelled    [x] Rescheduled appointment    [] No-show     Reason given by patient:    []  Patient ill    [x]  Conflicting appointment    [] No transportation      [] Conflict with work    [] No reason given    [] Weather related    [] [id number]    [] Other:      Comments:        [x] Next appointment was confirmed    Electronically signed by: Pamella Spring

## 2021-11-09 ENCOUNTER — HOSPITAL ENCOUNTER (OUTPATIENT)
Dept: PHYSICAL THERAPY | Facility: CLINIC | Age: 51
Setting detail: THERAPIES SERIES
Discharge: HOME | End: 2021-11-09
Payer: COMMERCIAL

## 2021-11-09 PROCEDURE — 97161 PT EVAL LOW COMPLEX 20 MIN: CPT

## 2021-11-09 PROCEDURE — 97140 MANUAL THERAPY 1/> REGIONS: CPT

## 2021-11-09 NOTE — CONSULTS
[] [facility]) [facility] &  Therapy  [address]  [phone]  F: [phone] [] [address]  [place] [address]   [address]  P: [phone]  F: [phone] [] [place]  [address]  P: [phone]  F: [phone] [] [address]  [facility]   [address]   [place]: [phone]  F: [phone]  [x] [address]  P: [phone]  F: [phone]      Physical Therapy Spine Evaluation    Date:  2021  Patient: Chencho Khan  :  1970  MRN: [id number]  Physician: Audrey Burgess   Insurance: Pamella Oswaldo (48 visits remaining, no auth required)  Medical Diagnosis: Piriformis syndrome, L buttock pain, neck pain on R  Rehab Codes: G57.02, M79.18, M54.2  Onset date: ~21  Next Dr's appt.: TBA pending PT    Subjective:   CC: Pt arrives to PT after subsequent vists with persistant L sided LLE cramps. Patient states to having a signifincant improvement in pain since beginning previous PT, is currently only having cramps at most 3x/wk. Associates cramping with stress, first thing in the morning. Describes LLE pain as numbness, tightness and cramping. Pt arrives to specifically trial DN.        PMHx: [] Unremarkable [] Diabetes [] HTN  [] Pacemaker   [] MI/Heart Problems [] Cancer [] Arthritis [] Other:              [x] Refer to full medical chart  In EPIC         Tests: [] X-Ray: [x] MRI: Per pt normal  [] Other:    Medications: [x] Refer to full medical record [] None [] Other:  Allergies:      [x] Refer to full medical record [] None [] Other:        Marital Status    Home type    Stairs from outside            [employer]    Stairs inside            [employer]    Employment Unemployed   Job status    Work Activities/duties     Recreational Activities Walking, walks anywhere from 1-3miles per day        Pain present? None currently    Location L sided posterior leg    Pain Rating currently 0/10   Pain at worse 3/10   Pain at best 0/10   Description of pain Numbness, tightness, cramping    Altered Sensation Numbness at times   What makes it worse unsure   What makes it better unsure   Symptom progression Improving   Sleep Not effected              Objective:   STRENGTH  ROM    Left Right Cervical    L1-2 Hip Flex 4+/5 4+/5 Flexion    Hip Abd   Extension    L3-4 Knee Ext 4+/5 4+/5 Rotation L R   L4 Ankle DF   Sidebend L R   L5 EHL   Retraction    S1 Plant. Flex   Lumbar    Abdominals   Flexion WFL   Erector Spinae   Extension WFL      Rotation L WFL R WFL      Sidebend L WFL R WFL     TESTS (+/-) LEFT RIGHT Not Tested   SLR [] sit [] supine   []   Hamstring (SLR)   []   SKTC   []   DKTC   []   Slump/Dural   []   SI JT   []   YULIET negative negative []   Joint Mobility   []   Cerv. Comp   []   Cerv. Distraction   []   Cerv. Alar/Transverse   []   Vertebral Artery   []   Adsons   []   Harshil Salas   []   Alec Tests ? Pain ? Pain No Change Not Tested   RFIS [] [] [x] []   MILAGROS [] [] [x] []   RFIL [] [] [x] []   REIL [] [] [x] []   Rep Prot [] [] [x] []   Rep Retract [] [] [x] []       OBSERVATION No Deficit Deficit Not Tested Comments   Posture       Forward Head [] [] []    Rounded Shoulders [] [] []    Kyphosis [] [] []    Lordosis [] [] []    Leg Length Discrp [] [] []    Slumped Sitting [] [] []    Palpation [] [x] [] TTP L sided piriformis   Sensation [] [] []    Edema [] [] []    Neurological [] [] []                Functional Test: LEFS Score: 5% functionally impaired         Assessment:  Patient would benefit from skilled physical therapy services in order to: Decrease tightness in L sided piriformis contributing to LLE tightness and cramping.  Patient arrives with minimal symptoms, therefore will only require 1x/wk for 3-4 vists to determine continued need. Goals:        STG: (to be met in 6 treatments)  1. ? Pain: Decrease pain levels to 0/10 at worst down LLE  2. ? Function: Pt to ambulate at least 2 miles without any symptoms of LLE cramping   3. Independent with Home Exercise Programs  4. Improve score on assessment tool from 5% impaired to 1% impaired, demonstrating an improvement in ADLs                     Patient goals: To not have any more LLE cramping    Rehab Potential:  [x] Good  [] Fair  [] Poor   Suggested Professional Referral:  [x] No  [] Yes:  Barriers to Goal Achievement[de-identified]  [x] No  [] Yes:  Domestic Concerns:  [x] No  [] Yes:    Pt. Education:  [x] Plans/Goals, Risks/Benefits discussed  [x] Home exercise program    Method of Education: [x] Verbal  [x] Demo  [x] Written  Comprehension of Education:  [x] Verbalizes understanding. [x] Demonstrates understanding. [x] Needs Review. [] Demonstrates/verbalizes understanding of HEP/Ed previously given. Treatment Plan:  [x] Therapeutic Exercise    [] Aquatic Therapy   [x] Manual Therapy     [x] Electrical Stimulation  [x] Instruction in HEP      [] Lumbar/Cervical Traction  [x] Neuromuscular Re-education [x] Cold/hotpack  [] Iontophoresis: 4 mg/mL  [] Vasocompression (GameReady)                    Dexamethasone Sodium  [] Gait Training             Phosphate 40-80 mAmin  [x] Integrative Dry Needling         []  Medication allergies reviewed for use of    Dexamethasone Sodium Phosphate 4mg/ml     with iontophoresis treatments. Pt is not allergic. Frequency:  1 x/week for 6 visits    Todays Treatment:    Exercises:  Exercise  LLE piriformis syndrome, LLE cramps   Reps/ Time Weight/ Level Comments   NUSTEP            Supine piriformis stretch 3x30\"     Piriformis self DI                                                                        Other: Manual: DI and MFR to L piriformis and glut    Integrative Dry Needling: L sided piriformis.   Initiated Dry Needling this date with all risks and benefits explained, no adverse effects noted post.   Dry Needling perfomed in conjunction with Manual therapy to promote tissue extensibility, improve ROM, and reduce pain. No charge submitted for the time the needle was inserted. Specific Instructions for next treatment: Continue with manual and stretches, extension based exercises d/t relief of pain last PT sessions    Evaluation Complexity:  History (Personal factors, comorbidities) [x] 0 [] 1-2 [] 3+   Exam (limitations, restrictions) [x] 1-2 [] 3 [] 4+   Clinical presentation (progression) [x] Stable [] Evolving  [] Unstable   Decision Making [x] Low [] Moderate [] High    [x] Low Complexity [] Moderate Complexity [] High Complexity       Treatment Charges: Mins Units   [x] Evaluation       [x]  Low       []  Moderate       []  High 15 1   []  Modalities     []  Ther Exercise     [x]  Manual Therapy 25 2   []  Ther Activities     []  Aquatics     []  Vasocompression     [x]  Other: Dry Needling 5 0     TOTAL TREATMENT TIME: 40 minutes    Time in: 1402     Time Out: 1440    Electronically signed by: Dale Walker PT        Physician Signature:________________________________Date:__________________  By signing above or cosigning this note, I have reviewed this plan of care and certify a need for medically necessary rehabilitation services.      *PLEASE SIGN ABOVE AND FAX BACK ALL PAGES*

## 2021-11-17 ENCOUNTER — HOSPITAL ENCOUNTER (OUTPATIENT)
Dept: PHYSICAL THERAPY | Facility: CLINIC | Age: 51
Setting detail: THERAPIES SERIES
Discharge: HOME | End: 2021-11-17
Payer: COMMERCIAL

## 2021-11-17 PROCEDURE — 97140 MANUAL THERAPY 1/> REGIONS: CPT

## 2021-11-17 NOTE — FLOWSHEET NOTE
[] [facility]) - [facility] &  Therapy  [address]  [phone]  F: [phone] [] [address]  [facility] [address]   [address]  P: [phone]  F: [phone] [] [address] &  Therapy  [address] [facility]  P: [phone]  F: [phone] [x] [address]  P: [phone]  F: [phone] [] [address]  [facility]   [address]   [place]: [phone]  F: [phone]      Physical Therapy Daily Treatment Note    Date:  2021  Patient Name:  Chencho Khan    :  1970  MRN: [id number]  Physician: Audrey Burgess                          Insurance: Pamella Chacon (48 visits remaining, no auth required)  Medical Diagnosis: Piriformis syndrome, L buttock pain, neck pain on R              Rehab Codes: G57.02, M79.18, M54.2  Onset date: ~21               Next Dr's appt.: TBA pending PT  Visit# / total visits: 2/6     Cancels/No Shows: 0/0    Subjective:    Pain:  [] Yes  [] No Location:  N/A Pain Rating: (0-10 scale) 0/10  Pain altered Tx:  [] No  [] Yes  Action:  Comments: Pt arrives with no real change in symptoms, states to continuing to have \"cramps\" 2-3x/wk.      Objective:    Exercises:  Exercise  LLE piriformis syndrome, LLE cramps    Reps/ Time Weight/ Level Comments   Elliptical 5'                 Supine piriformis stretch 3x30\"       Piriformis self DI          Prone press up 2x10                                                                                                           Other: Manual: DI and MFR to L piriformis and glut     Integrative Dry Needling: L sided piriformis, two muscle twitch responses noted  Dry Needling perfomed in conjunction with Manual therapy to promote tissue extensibility, improve ROM, and reduce pain.  No charge submitted for the time the needle was inserted.        Specific Instructions for next treatment: Continue with manual and stretches, extension based exercises d/t relief of pain last PT sessions        Treatment Charges: Mins Units   []  Modalities     []  Ther Exercise     [x]  Manual Therapy 25 2   []  Ther Activities     []  Aquatics     []  Vasocompression     [x]  Other: Dry Needling 5 0   Total Treatment time 30 2        Assessment: [x] Progressing toward goals. Initiated treatment on Elliptical followed by manual including DN and MFR. Muscle twitch noted during DN, along with patient having a decrease in muscle tightness post. Ended with supine stretches. [] No change. [] Other:  [] Patient would continue to benefit from skilled physical therapy services in order to: Decrease tightness in L sided piriformis contributing to LLE tightness and cramping. Patient arrives with minimal symptoms, therefore will only require 1x/wk for 3-4 vists to determine continued need.            Goals:                               STG: (to be met in 6 treatments)  1. ? Pain: Decrease pain levels to 0/10 at worst down LLE  2. ? Function: Pt to ambulate at least 2 miles without any symptoms of LLE cramping   3. Independent with Home Exercise Programs  4. Improve score on assessment tool from 5% impaired to 1% impaired, demonstrating an improvement in ADLs                       Patient goals: To not have any more LLE cramping    Pt. Education:  [x] Yes  [] No  [] Reviewed Prior HEP/Ed  Method of Education: [x] Verbal  [] Demo  [] Written  Comprehension of Education:  [x] Verbalizes understanding. [x] Demonstrates understanding. [] Needs review. [] Demonstrates/verbalizes HEP/Ed previously given. Plan: [x] Continue current frequency toward long and short term goals.           Time In: 1400            Time Out: 1430    Electronically signed by:  Jhon Salazar, PT

## 2021-11-24 ENCOUNTER — HOSPITAL ENCOUNTER (OUTPATIENT)
Dept: PHYSICAL THERAPY | Facility: CLINIC | Age: 51
Setting detail: THERAPIES SERIES
Discharge: HOME | End: 2021-11-24
Payer: COMMERCIAL

## 2021-11-24 PROCEDURE — 97140 MANUAL THERAPY 1/> REGIONS: CPT

## 2021-11-24 NOTE — FLOWSHEET NOTE
[] [facility]) - [facility] &  Therapy  [address]  [phone]  F: [phone] [] [address]   [address]  P: [phone]  F: [phone] [] [address] &  Therapy  [address] [facility] [address]  P: [phone]  F: [phone] [x] [address]  P: [phone]  F: [phone] [] [address]  [facility]   [address]   [place]: [phone]  F: [phone]      Physical Therapy Daily Treatment Note    Date:  2021  Patient Name:  Zafar Holloway    :  1970  MRN: [id number]  Physician: Serena Sheldon                          Insurance: Josefa Avendaño 150 (48 visits remaining, no auth required)  Medical Diagnosis: Piriformis syndrome, L buttock pain, neck pain on R              Rehab Codes: G57.02, M79.18, M54.2  Onset date: ~21               Next Dr's appt.: TBA pending PT  Visit# / total visits: 3/6     Cancels/No Shows: 0/0    Subjective:    Pain:  [] Yes  [] No Location:  N/A Pain Rating: (0-10 scale) 0/10  Pain altered Tx:  [] No  [] Yes  Action:  Comments: Pt arrives without any soreness, however does state to having typical \"cramps\" but does feel that cramps are secondary to being really stressed this week.       Objective:    Exercises:  Exercise  LLE piriformis syndrome, LLE cramps    Reps/ Time Weight/ Level Comments   Elliptical 5'                 Supine piriformis stretch 3x30\"       Piriformis self DI          Prone press up 2x10                  Standing HS stretch 3x30\"   x   Step gastroc stretch 3x30\"                                                                             Other: Manual: DI and MFR to L piriformis and glut, HS and gastroc via hypervolt     Integrative Dry Needling: L sided piriformis, two muscle twitch responses noted  Dry Needling perfomed in conjunction with Manual therapy to promote tissue extensibility, improve ROM, and reduce pain.  No charge submitted for the time the needle was inserted.        Specific Instructions for next treatment: Continue with manual and stretches, extension based exercises d/t relief of pain last PT sessions        Treatment Charges: Mins Units   []  Modalities     []  Ther Exercise     [x]  Manual Therapy 25 2   []  Ther Activities     []  Aquatics     []  Vasocompression     [x]  Other: Dry Needling 5 0   Total Treatment time 30 2        Assessment: [x] Progressing toward goals. Initiated treatment on Elliptical followed by manual including DN and MFR. Discussed trying DN to HS and gastroc however pt is wearing tights and requests to try next visit. States having decreased tightness post. Demonstrated HS and gastroc stretch on stairs and educated pt to complete throughout the day, especially when cleaning, to help prevent muscle cramps. Pt voiced and demonstrated good understanding. [] No change. [] Other:  [] Patient would continue to benefit from skilled physical therapy services in order to: Decrease tightness in L sided piriformis contributing to LLE tightness and cramping. Patient arrives with minimal symptoms, therefore will only require 1x/wk for 3-4 vists to determine continued need.            Goals:                               STG: (to be met in 6 treatments)  1. ? Pain: Decrease pain levels to 0/10 at worst down LLE  2. ? Function: Pt to ambulate at least 2 miles without any symptoms of LLE cramping   3. Independent with Home Exercise Programs  4. Improve score on assessment tool from 5% impaired to 1% impaired, demonstrating an improvement in ADLs                       Patient goals: To not have any more LLE cramping    Pt.  Education:  [x] Yes  [] No  [] Reviewed Prior HEP/Ed  Method of Education: [x] Verbal  [] Demo  [] Written  Comprehension of Education:  [x] Verbalizes understanding. [x] Demonstrates understanding. [] Needs review. [] Demonstrates/verbalizes HEP/Ed previously given. Plan: [x] Continue current frequency toward long and short term goals.           Time In: 0141            Time Out: 1231    Electronically signed by:  Jhon aSlazar PT

## 2021-11-30 ENCOUNTER — HOSPITAL ENCOUNTER (OUTPATIENT)
Dept: PHYSICAL THERAPY | Facility: CLINIC | Age: 51
Setting detail: THERAPIES SERIES
Discharge: HOME | End: 2021-11-30
Payer: COMMERCIAL

## 2021-11-30 PROCEDURE — 97140 MANUAL THERAPY 1/> REGIONS: CPT

## 2021-11-30 PROCEDURE — 97110 THERAPEUTIC EXERCISES: CPT

## 2021-11-30 NOTE — FLOWSHEET NOTE
[] [facility]) - [facility] &  Therapy  [address]  [phone]  F: [phone] [] [address]  [employer] [address]   [address]  P: [phone]  F: [phone] [] [address] &  Therapy  [address] [facility]  P: [phone]  F: [phone] [x] [address]  P: [phone]  F: [phone] [] [address]  [facility]   [address]   [place]: [phone]  F: [phone]      Physical Therapy Daily Treatment Note    Date:  2021  Patient Name:  Belem Amaya    :  1970  MRN: [id number]  Physician: Colonel Robbins                          Insurance: [employer] Mehnaz Avendaño 150 (48 visits remaining, no auth required)  Medical Diagnosis: Piriformis syndrome, L buttock pain, neck pain on R              Rehab Codes: G57.02, M79.18, M54.2  Onset date: ~21               Next Dr's appt.: TBA pending PT  Visit# / total visits: 4/6     Cancels/No Shows: 0/0    Subjective:    Pain:  [] Yes  [] No Location:  N/A Pain Rating: (0-10 scale) 0/10  Pain altered Tx:  [] No  [] Yes  Action:  Comments: Pt arrives with decreased frequency of cramping in leg, only happened once in the past week.        Objective:    Exercises:  Exercise  LLE piriformis syndrome, LLE cramps    Reps/ Time Weight/ Level Comments   Elliptical 5'                 Supine piriformis stretch 3x30\"       Piriformis self DI          Prone press up 2x10                  Standing HS stretch 3x30\"   x   Step gastroc stretch 3x30\"                                                                             Other: Manual: DI and MFR to L piriformis and glut, HS and gastroc via hypervolt     Integrative Dry Needling: L sided piriformis, two muscle twitch responses noted  Dry Needling perfomed in conjunction with Manual therapy to promote tissue extensibility, improve ROM, and reduce pain.  No charge submitted for the time the needle was inserted. -HELD this date to trial how patient feels from hypervolt and stretching alone.        Specific Instructions for next treatment: Continue with manual and stretches, extension based exercises d/t relief of pain last PT sessions        Treatment Charges: Mins Units   []  Modalities     [x]  Ther Exercise 15 1   [x]  Manual Therapy 15 1   []  Ther Activities     []  Aquatics     []  Vasocompression     []  Other: Dry Needling     Total Treatment time 30 2        Assessment: [x] Progressing toward goals. Initiated treatment on Elliptical followed by standing stretches listed above; reviewed technique for patient to continue at home. Concluded with manual including hypervolt to piriformis, HS and gastroc. Pt voiced having no pain or soreness throughout session, agreeable to place chart on hold for 30 days in order pt to continue as HEP. [] No change. [] Other:  [] Patient would continue to benefit from skilled physical therapy services in order to: Decrease tightness in L sided piriformis contributing to LLE tightness and cramping. Patient arrives with minimal symptoms, therefore will only require 1x/wk for 3-4 vists to determine continued need.            Goals:                               STG: (to be met in 6 treatments)  1. ? Pain: Decrease pain levels to 0/10 at worst down LLE MET  2. ? Function: Pt to ambulate at least 2 miles without any symptoms of LLE cramping MET  3. Independent with Home Exercise Programs MET  4. Improve score on assessment tool from 5% impaired to 1% impaired, demonstrating an improvement in ADLs                       Patient goals: To not have any more LLE cramping MET    Pt.  Education:  [x] Yes  [] No  [] Reviewed Prior HEP/Ed  Method of Education: [x] Verbal  [] Demo  [] Written  Comprehension of Education:  [x] [employer] understanding. [x] Demonstrates understanding. [] Needs review. [] Demonstrates/verbalizes HEP/Ed previously given. Plan: [x] Continue current frequency toward long and short term goals.           Time In: 1500           Time Out: 1530    Electronically signed by:  Lan Jovel PT

## 2021-12-16 ENCOUNTER — HOSPITAL ENCOUNTER (OUTPATIENT)
Dept: PHYSICAL THERAPY | Facility: CLINIC | Age: 51
Setting detail: THERAPIES SERIES
Discharge: HOME | End: 2021-12-16
Payer: COMMERCIAL

## 2021-12-16 PROCEDURE — 97110 THERAPEUTIC EXERCISES: CPT

## 2021-12-16 NOTE — FLOWSHEET NOTE
[] [facility]) - [facility] &  Therapy  [address]  [phone]  F: [phone] [] [address]  [employer] [address]   [address]  P: [phone]  F: [phone] [] [address] &  Therapy  [address] [facility]  P: [phone]  F: [phone] [x] [address]  P: [phone]  F: [phone] [] [address]  [facility]   [address]   [place]: [phone]  F: [phone]      Physical Therapy Daily Treatment Note    Date:  2021  Patient Name:  Santino Mendoza    :  1970  MRN: [id number]  Physician: David Rosen                          Insurance: Manju Carlson (48 visits remaining, no auth required)  Medical Diagnosis: Piriformis syndrome, L buttock pain, neck pain on R              Rehab Codes: G57.02, M79.18, M54.2  Onset date: ~21               Next Dr's appt.: TBA pending PT  Visit# / total visits: 5/6     Cancels/No Shows: 0/0    Subjective:    Pain:  [] Yes  [] No Location:  N/A Pain Rating: (0-10 scale) 0/10  Pain altered Tx:  [] No  [] Yes  Action:  Comments: Pt arrives without pain, states to having only one small leg cramp this morning. Ready to be done with PT this date.        Objective:    Exercises:  Exercise  LLE piriformis syndrome, LLE cramps    Reps/ Time Weight/ Level Comments   Elliptical 5'                 Supine piriformis stretch 3x30\"       Piriformis self DI          Prone press up 2x10    -              Standing HS stretch 3x30\"   x   Step gastroc stretch 3x30\"   x                                                                         Other: Manual: DI and MFR to L piriformis and glut, HS and gastroc via hypervolt- not today      Integrative Dry Needling: L sided piriformis, two muscle twitch responses noted  Dry Needling perfomed in conjunction with Manual therapy to promote tissue extensibility, improve ROM, and reduce pain.  No charge submitted for the time the needle was inserted. -HELD         Specific Instructions for next treatment: Continue with manual and stretches, extension based exercises d/t relief of pain last PT sessions        Treatment Charges: Mins Units   []  Modalities     [x]  Ther Exercise 25 2   []  Manual Therapy     []  Ther Activities     []  Aquatics     []  Vasocompression     []  Other: Dry Needling     Total Treatment time 30 2        Assessment: [x] Progressing toward goals. Initiated treatment on Elliptical followed by standing stretches listed above; reviewed technique for patient to continue at home. Pt voices agreement with plan to be DC this date. [] No change. [] Other:  [] Patient would continue to benefit from skilled physical therapy services in order to: Decrease tightness in L sided piriformis contributing to LLE tightness and cramping. Patient arrives with minimal symptoms, therefore will only require 1x/wk for 3-4 vists to determine continued need.            Goals:                               STG: (to be met in 6 treatments)  1. ? Pain: Decrease pain levels to 0/10 at worst down LLE MET  2. ? Function: Pt to ambulate at least 2 miles without any symptoms of LLE cramping MET  3. Independent with Home Exercise Programs MET  4. Improve score on assessment tool from 5% impaired to 1% impaired, demonstrating an improvement in ADLs                       Patient goals: To not have any more LLE cramping MET    Pt. Education:  [x] Yes  [] No  [] Reviewed Prior HEP/Ed  Method of Education: [x] Verbal  [] Demo  [] Written  Comprehension of Education:  [x] Verbalizes understanding. [x] Demonstrates understanding. [] Needs review. [] Demonstrates/verbalizes HEP/Ed previously given. Plan: [x] Continue current frequency toward long and short term goals.           Time In: 1300           Time Out: 1330    Electronically signed by:  Radha Ramirez, PT

## 2024-01-31 ENCOUNTER — OFFICE VISIT (OUTPATIENT)
Age: 54
End: 2024-01-31
Payer: COMMERCIAL

## 2024-01-31 VITALS
DIASTOLIC BLOOD PRESSURE: 70 MMHG | OXYGEN SATURATION: 97 % | WEIGHT: 163 LBS | HEART RATE: 67 BPM | SYSTOLIC BLOOD PRESSURE: 122 MMHG | BODY MASS INDEX: 30.78 KG/M2 | HEIGHT: 61 IN | TEMPERATURE: 97.2 F

## 2024-01-31 DIAGNOSIS — E78.5 DYSLIPIDEMIA: Primary | ICD-10-CM

## 2024-01-31 DIAGNOSIS — J30.89 NON-SEASONAL ALLERGIC RHINITIS DUE TO OTHER ALLERGIC TRIGGER: ICD-10-CM

## 2024-01-31 DIAGNOSIS — J45.30 MILD PERSISTENT ASTHMA WITHOUT COMPLICATION: ICD-10-CM

## 2024-01-31 DIAGNOSIS — H93.11 TINNITUS OF RIGHT EAR: ICD-10-CM

## 2024-01-31 DIAGNOSIS — M06.09 RHEUMATOID ARTHRITIS OF MULTIPLE SITES WITH NEGATIVE RHEUMATOID FACTOR (HCC): ICD-10-CM

## 2024-01-31 DIAGNOSIS — I10 ESSENTIAL HYPERTENSION: ICD-10-CM

## 2024-01-31 PROBLEM — M06.9 RHEUMATOID ARTHRITIS (HCC): Status: ACTIVE | Noted: 2023-06-29

## 2024-01-31 PROBLEM — J45.909 ASTHMA: Status: ACTIVE | Noted: 2023-06-29

## 2024-01-31 PROCEDURE — 3074F SYST BP LT 130 MM HG: CPT | Performed by: PHYSICIAN ASSISTANT

## 2024-01-31 PROCEDURE — 99204 OFFICE O/P NEW MOD 45 MIN: CPT | Performed by: PHYSICIAN ASSISTANT

## 2024-01-31 PROCEDURE — 3078F DIAST BP <80 MM HG: CPT | Performed by: PHYSICIAN ASSISTANT

## 2024-01-31 RX ORDER — SULFASALAZINE 500 MG/1
500 TABLET ORAL 2 TIMES DAILY
COMMUNITY

## 2024-01-31 RX ORDER — TRIAMTERENE AND HYDROCHLOROTHIAZIDE 75; 50 MG/1; MG/1
0.5 TABLET ORAL DAILY
COMMUNITY

## 2024-01-31 RX ORDER — AMLODIPINE BESYLATE 5 MG/1
5 TABLET ORAL DAILY
COMMUNITY
Start: 2023-09-26

## 2024-01-31 RX ORDER — ATORVASTATIN CALCIUM 10 MG/1
10 TABLET, FILM COATED ORAL EVERY OTHER DAY
COMMUNITY

## 2024-01-31 RX ORDER — TRIAMCINOLONE ACETONIDE 1 MG/G
CREAM TOPICAL PRN
COMMUNITY
Start: 2022-08-25

## 2024-01-31 RX ORDER — GLUCOSAMINE SULFATE 500 MG
CAPSULE ORAL
COMMUNITY

## 2024-01-31 RX ORDER — M-VIT,TX,IRON,MINS/CALC/FOLIC 27MG-0.4MG
1 TABLET ORAL DAILY
COMMUNITY

## 2024-01-31 RX ORDER — ALBUTEROL SULFATE 90 UG/1
2 AEROSOL, METERED RESPIRATORY (INHALATION) 2 TIMES DAILY
COMMUNITY

## 2024-01-31 RX ORDER — FLUTICASONE PROPIONATE AND SALMETEROL 250; 50 UG/1; UG/1
1 POWDER RESPIRATORY (INHALATION) PRN
COMMUNITY
Start: 2023-04-04

## 2024-01-31 SDOH — ECONOMIC STABILITY: HOUSING INSECURITY
IN THE LAST 12 MONTHS, WAS THERE A TIME WHEN YOU DID NOT HAVE A STEADY PLACE TO SLEEP OR SLEPT IN A SHELTER (INCLUDING NOW)?: NO

## 2024-01-31 SDOH — ECONOMIC STABILITY: FOOD INSECURITY: WITHIN THE PAST 12 MONTHS, THE FOOD YOU BOUGHT JUST DIDN'T LAST AND YOU DIDN'T HAVE MONEY TO GET MORE.: NEVER TRUE

## 2024-01-31 SDOH — ECONOMIC STABILITY: FOOD INSECURITY: WITHIN THE PAST 12 MONTHS, YOU WORRIED THAT YOUR FOOD WOULD RUN OUT BEFORE YOU GOT MONEY TO BUY MORE.: NEVER TRUE

## 2024-01-31 SDOH — ECONOMIC STABILITY: INCOME INSECURITY: HOW HARD IS IT FOR YOU TO PAY FOR THE VERY BASICS LIKE FOOD, HOUSING, MEDICAL CARE, AND HEATING?: NOT HARD AT ALL

## 2024-01-31 ASSESSMENT — ENCOUNTER SYMPTOMS
RHINORRHEA: 0
VOMITING: 0
EYE REDNESS: 0
EYE PAIN: 0
WHEEZING: 0
SORE THROAT: 0
SINUS PRESSURE: 0
NAUSEA: 0
CONSTIPATION: 0
BLOOD IN STOOL: 0
COUGH: 0
SHORTNESS OF BREATH: 0
DIARRHEA: 0
SINUS PAIN: 0
BACK PAIN: 0
ABDOMINAL PAIN: 0

## 2024-01-31 ASSESSMENT — PATIENT HEALTH QUESTIONNAIRE - PHQ9
SUM OF ALL RESPONSES TO PHQ QUESTIONS 1-9: 0
1. LITTLE INTEREST OR PLEASURE IN DOING THINGS: 0
SUM OF ALL RESPONSES TO PHQ9 QUESTIONS 1 & 2: 0
SUM OF ALL RESPONSES TO PHQ QUESTIONS 1-9: 0
2. FEELING DOWN, DEPRESSED OR HOPELESS: 0
SUM OF ALL RESPONSES TO PHQ QUESTIONS 1-9: 0
SUM OF ALL RESPONSES TO PHQ QUESTIONS 1-9: 0

## 2024-01-31 NOTE — PROGRESS NOTES
[facility]     Date of Visit:  2024  Patient Name: Perri Diaz   Patient :  1970     CHIEF COMPLAINT:     Perri Diaz is a 53 y.o. 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 Last there in the spring, Knows Dr. Santos  Sees Dr. Morton regularly for asthma, allergies. Gets allergy shots every week or every other week past 15 years.   Sees Rianna Montaño NP with [facility] for RA, dx last spring but reports labs have been negative. A few joint pains, left elbow, fingers. On sulfasalazine now. Had recent labs done. Had appt last week  Sees Dr. Lee, dermatology  Sees Dr. Catherine for gyn care  Has colonoscopy set up March, DR. Ardon  Here to get established.   Same bp and cholesterol meds for years.     REVIEW OF SYSTEMS     Review of Systems   Constitutional:  Negative for chills, fever and unexpected weight change.   HENT:  Negative for congestion, ear pain, hearing loss, rhinorrhea, sinus pressure, sinus pain, sneezing and sore throat.    Eyes:  Negative for pain, redness and visual disturbance.   Respiratory:  Negative for cough, shortness of breath and wheezing.    Cardiovascular:  Negative for chest pain, palpitations and leg swelling.   Gastrointestinal:  Negative for abdominal pain, blood in stool, constipation, diarrhea, nausea and vomiting.   Endocrine: Negative for cold intolerance and heat intolerance.   Genitourinary:  Negative for difficulty urinating, dysuria, frequency, hematuria and urgency.   Musculoskeletal:  Positive for arthralgias. Negative for back pain, gait problem, joint swelling, myalgias and neck pain.   Skin:  Negative for rash and wound.   Allergic/Immunologic: Negative for immunocompromised state.

## 2024-05-07 RX ORDER — AMLODIPINE BESYLATE 5 MG/1
5 TABLET ORAL DAILY
Qty: 90 TABLET | Refills: 1 | Status: SHIPPED | OUTPATIENT
Start: 2024-05-07

## 2024-05-07 NOTE — TELEPHONE ENCOUNTER
Perri Diaz is calling to request a refill on the following medication(s):    Medication Request:  Requested Prescriptions     Pending Prescriptions Disp Refills    amLODIPine (NORVASC) 5 MG tablet 30 tablet      Sig: Take 1 tablet by mouth daily       Last Visit Date (If Applicable):  Visit date not found    Next Visit Date:    6/5/2024

## 2024-06-05 ENCOUNTER — OFFICE VISIT (OUTPATIENT)
Age: 54
End: 2024-06-05
Payer: COMMERCIAL

## 2024-06-05 VITALS
TEMPERATURE: 96.8 F | HEIGHT: 61 IN | WEIGHT: 164 LBS | HEART RATE: 80 BPM | DIASTOLIC BLOOD PRESSURE: 80 MMHG | BODY MASS INDEX: 30.96 KG/M2 | SYSTOLIC BLOOD PRESSURE: 118 MMHG | OXYGEN SATURATION: 96 %

## 2024-06-05 DIAGNOSIS — M06.09 RHEUMATOID ARTHRITIS OF MULTIPLE SITES WITH NEGATIVE RHEUMATOID FACTOR (HCC): ICD-10-CM

## 2024-06-05 DIAGNOSIS — I10 ESSENTIAL HYPERTENSION: Primary | ICD-10-CM

## 2024-06-05 DIAGNOSIS — E78.5 DYSLIPIDEMIA: ICD-10-CM

## 2024-06-05 DIAGNOSIS — D12.6 SERRATED ADENOMA OF COLON: ICD-10-CM

## 2024-06-05 DIAGNOSIS — J45.30 MILD PERSISTENT ASTHMA WITHOUT COMPLICATION: ICD-10-CM

## 2024-06-05 DIAGNOSIS — Z86.16 HISTORY OF COVID-19: ICD-10-CM

## 2024-06-05 DIAGNOSIS — H93.11 TINNITUS OF RIGHT EAR: ICD-10-CM

## 2024-06-05 DIAGNOSIS — Z82.49 FAMILY HISTORY OF CEREBRAL ANEURYSM: ICD-10-CM

## 2024-06-05 DIAGNOSIS — J30.89 NON-SEASONAL ALLERGIC RHINITIS DUE TO OTHER ALLERGIC TRIGGER: ICD-10-CM

## 2024-06-05 PROCEDURE — 3079F DIAST BP 80-89 MM HG: CPT | Performed by: INTERNAL MEDICINE

## 2024-06-05 PROCEDURE — 3074F SYST BP LT 130 MM HG: CPT | Performed by: INTERNAL MEDICINE

## 2024-06-05 PROCEDURE — 99214 OFFICE O/P EST MOD 30 MIN: CPT | Performed by: INTERNAL MEDICINE

## 2024-06-05 RX ORDER — IBUPROFEN 800 MG/1
800 TABLET ORAL EVERY 6 HOURS PRN
COMMUNITY

## 2024-06-05 ASSESSMENT — ENCOUNTER SYMPTOMS
NAUSEA: 0
EYE REDNESS: 0
SINUS PRESSURE: 0
COUGH: 0
BACK PAIN: 0
ABDOMINAL PAIN: 0
SORE THROAT: 0
EYE PAIN: 0
SHORTNESS OF BREATH: 0
VOMITING: 0
WHEEZING: 0
BLOOD IN STOOL: 0
DIARRHEA: 0
RHINORRHEA: 0
CONSTIPATION: 0
SINUS PAIN: 0

## 2024-06-05 NOTE — PROGRESS NOTES
[facility]     Date of Visit:  2024  Patient Name: Perri Diaz   Patient :  1970     CHIEF COMPLAINT:     Perri Diaz is a 53 y.o. female who presents today for an general visit to be evaluated for the following condition(s):  Chief Complaint   Patient presents with    Hypertension    Follow-up     4 months - new Patient       HISTORY OF PRESENT ILLNESS      Walking daily 2 miles/day. Hand, knee, low back, hip joint discomfort. On osteobiflex, turmeric, glutatione, cinnamon. Ibuprofen prn. On sulfasalazine. No falls. Taking all meds. Excellent labs.     Colonoscopy in March through Dr. Ardon reviewed.  Serrated adenoma cecal and ascending colon.  Repeating in 3 years    REVIEW OF SYSTEMS     Review of Systems   Constitutional:  Negative for chills, fever and unexpected weight change.   HENT:  Negative for congestion, ear pain, hearing loss, rhinorrhea, sinus pressure, sinus pain, sneezing and sore throat.    Eyes:  Negative for pain, redness and visual disturbance.   Respiratory:  Negative for cough, shortness of breath and wheezing.    Cardiovascular:  Negative for chest pain, palpitations and leg swelling.   Gastrointestinal:  Negative for abdominal pain, blood in stool, constipation, diarrhea, nausea and vomiting.   Endocrine: Negative for cold intolerance and heat intolerance.   Genitourinary:  Negative for difficulty urinating, dysuria, frequency, hematuria and urgency.   Musculoskeletal:  Negative for arthralgias, back pain, gait problem, joint swelling, myalgias and neck pain.   Skin:  Negative for rash and wound.   Allergic/Immunologic: Negative for immunocompromised state.   Neurological:  Negative for dizziness, tremors, seizures, syncope, speech difficulty, weakness, light-headedness, numbness and headaches.   Psychiatric/Behavioral:  Negative for confusion, hallucinations and sleep disturbance. The patient is not nervous/anxious.         REVIEWED INFORMATION

## 2024-06-17 DIAGNOSIS — E78.5 DYSLIPIDEMIA: Primary | ICD-10-CM

## 2024-06-17 NOTE — TELEPHONE ENCOUNTER
Perri Diaz is calling to request a refill on the following medication(s):    Last Visit Date (If Applicable):  6/5/2024    Next Visit Date:    Visit date not found    Medication Request:  Requested Prescriptions     Pending Prescriptions Disp Refills    atorvastatin (LIPITOR) 10 MG tablet [Pharmacy Med Name: ATORVASTATIN TABS 10MG] 90 tablet 3     Sig: Take 1 tablet by mouth daily

## 2024-06-18 RX ORDER — ATORVASTATIN CALCIUM 10 MG/1
10 TABLET, FILM COATED ORAL DAILY
Qty: 90 TABLET | Refills: 3 | Status: SHIPPED | OUTPATIENT
Start: 2024-06-18

## 2024-09-06 ENCOUNTER — HOSPITAL ENCOUNTER (OUTPATIENT)
Age: 54
Discharge: HOME | End: 2024-09-08
Payer: COMMERCIAL

## 2024-09-06 ENCOUNTER — OFFICE VISIT (OUTPATIENT)
Age: 54
End: 2024-09-06
Payer: COMMERCIAL

## 2024-09-06 VITALS
BODY MASS INDEX: 32.02 KG/M2 | TEMPERATURE: 96.7 F | SYSTOLIC BLOOD PRESSURE: 130 MMHG | HEART RATE: 70 BPM | HEIGHT: 61 IN | WEIGHT: 169.58 LBS | OXYGEN SATURATION: 97 % | DIASTOLIC BLOOD PRESSURE: 80 MMHG

## 2024-09-06 DIAGNOSIS — I10 ESSENTIAL HYPERTENSION: ICD-10-CM

## 2024-09-06 DIAGNOSIS — M25.522 LEFT ELBOW PAIN: Primary | ICD-10-CM

## 2024-09-06 DIAGNOSIS — M06.09 RHEUMATOID ARTHRITIS OF MULTIPLE SITES WITH NEGATIVE RHEUMATOID FACTOR (HCC): ICD-10-CM

## 2024-09-06 DIAGNOSIS — M25.522 LEFT ELBOW PAIN: ICD-10-CM

## 2024-09-06 PROCEDURE — 3079F DIAST BP 80-89 MM HG: CPT | Performed by: PHYSICIAN ASSISTANT

## 2024-09-06 PROCEDURE — 3075F SYST BP GE 130 - 139MM HG: CPT | Performed by: PHYSICIAN ASSISTANT

## 2024-09-06 PROCEDURE — 99213 OFFICE O/P EST LOW 20 MIN: CPT | Performed by: PHYSICIAN ASSISTANT

## 2024-09-06 PROCEDURE — 73080 X-RAY EXAM OF ELBOW: CPT

## 2024-09-06 ASSESSMENT — ENCOUNTER SYMPTOMS
EYE PAIN: 0
SORE THROAT: 0
VOMITING: 0
COUGH: 0
SHORTNESS OF BREATH: 0
BLOOD IN STOOL: 0
DIARRHEA: 0
ABDOMINAL PAIN: 0
WHEEZING: 0
RHINORRHEA: 0
CONSTIPATION: 0
SINUS PRESSURE: 0
SINUS PAIN: 0
NAUSEA: 0
BACK PAIN: 0
EYE REDNESS: 0

## 2024-09-06 NOTE — PROGRESS NOTES
[facility]     Date of Visit:  2024  Patient Name: Perri Diaz   Patient :  1970     CHIEF COMPLAINT:     Perri Diaz is a 53 y.o. female who presents today for an general visit to be evaluated for the following condition(s):  Chief Complaint   Patient presents with    swelling and inflammation     Left arm, muscle pain and swelling onset 2 weeks. Seen rheumatology a few months ago, she is states that it feels as though her bone is shifting.        HISTORY OF PRESENT ILLNESS      Left elbow pain , swelling started about 2 or 3 months ago but seem to get really bad about a week ago.  Seem to be a little bit hot and red at the time.  More painful and more swollen.  She did take ibuprofen for about 6 days which helped quite a bit and did ice.  She called her rheumatologist and was prescribed a Medrol Dosepak yesterday.  Rheumatology cannot see her until October so she wanted to come in here just to make sure it was okay to take the Medrol and also to probably get some x-rays  No known injury to the elbow.  She does not do any repetitive motions.  She tries not to rest her arm on the elbow.  Right handed    REVIEW OF SYSTEMS     Review of Systems   Constitutional:  Negative for chills, fever and unexpected weight change.   HENT:  Negative for congestion, ear pain, hearing loss, rhinorrhea, sinus pressure, sinus pain, sneezing and sore throat.    Eyes:  Negative for pain, redness and visual disturbance.   Respiratory:  Negative for cough, shortness of breath and wheezing.    Cardiovascular:  Negative for chest pain, palpitations and leg swelling.   Gastrointestinal:  Negative for abdominal pain, blood in stool, constipation, diarrhea, nausea and vomiting.   Endocrine: Negative for cold intolerance and heat intolerance.   Genitourinary:  Negative for difficulty urinating, dysuria, frequency, hematuria and urgency.   Musculoskeletal:  Positive for arthralgias. Negative for back pain,

## 2024-12-05 ENCOUNTER — OFFICE VISIT (OUTPATIENT)
Age: 54
End: 2024-12-05
Payer: COMMERCIAL

## 2024-12-05 VITALS
HEIGHT: 61 IN | TEMPERATURE: 96.8 F | WEIGHT: 164.2 LBS | SYSTOLIC BLOOD PRESSURE: 118 MMHG | DIASTOLIC BLOOD PRESSURE: 88 MMHG | BODY MASS INDEX: 31 KG/M2 | HEART RATE: 85 BPM | OXYGEN SATURATION: 98 %

## 2024-12-05 DIAGNOSIS — M06.09 RHEUMATOID ARTHRITIS OF MULTIPLE SITES WITH NEGATIVE RHEUMATOID FACTOR (HCC): ICD-10-CM

## 2024-12-05 DIAGNOSIS — I10 ESSENTIAL HYPERTENSION: Primary | ICD-10-CM

## 2024-12-05 DIAGNOSIS — J45.30 MILD PERSISTENT ASTHMA WITHOUT COMPLICATION: ICD-10-CM

## 2024-12-05 DIAGNOSIS — E78.5 DYSLIPIDEMIA: ICD-10-CM

## 2024-12-05 PROCEDURE — 3079F DIAST BP 80-89 MM HG: CPT | Performed by: PHYSICIAN ASSISTANT

## 2024-12-05 PROCEDURE — 3074F SYST BP LT 130 MM HG: CPT | Performed by: PHYSICIAN ASSISTANT

## 2024-12-05 PROCEDURE — 99214 OFFICE O/P EST MOD 30 MIN: CPT | Performed by: PHYSICIAN ASSISTANT

## 2024-12-05 RX ORDER — AMLODIPINE BESYLATE 5 MG/1
5 TABLET ORAL DAILY
Qty: 90 TABLET | Refills: 3 | Status: SHIPPED | OUTPATIENT
Start: 2024-12-05

## 2024-12-05 ASSESSMENT — ENCOUNTER SYMPTOMS
RHINORRHEA: 0
VOMITING: 0
EYE REDNESS: 0
SHORTNESS OF BREATH: 0
NAUSEA: 0
SINUS PAIN: 0
ABDOMINAL PAIN: 0
EYE PAIN: 0
WHEEZING: 0
CONSTIPATION: 0
COUGH: 0
BLOOD IN STOOL: 0
SINUS PRESSURE: 0
DIARRHEA: 0
SORE THROAT: 0
BACK PAIN: 0

## 2024-12-05 NOTE — PROGRESS NOTES
[facility]     Date of Visit:  2024  Patient Name: Perri Diaz   Patient :  1970     CHIEF COMPLAINT:     Perri Diaz is a 54 y.o. female who presents today for an general visit to be evaluated for the following condition(s):  Chief Complaint   Patient presents with    6 Month Follow-Up       HISTORY OF PRESENT ILLNESS      Sees rheumatologist, Dr. Mcgrath.  Was there a month ago.  He increased her sulfasalazine to 1000 mg twice a day ordered labs and x-rays.     Seeing allergist, Dr. Andrews now, getting allergy shots still,  because Dr. Morton retiring    REVIEW OF SYSTEMS     Review of Systems   Constitutional:  Negative for chills, fever and unexpected weight change.   HENT:  Negative for congestion, ear pain, hearing loss, rhinorrhea, sinus pressure, sinus pain, sneezing and sore throat.    Eyes:  Negative for pain, redness and visual disturbance.   Respiratory:  Negative for cough, shortness of breath and wheezing.    Cardiovascular:  Negative for chest pain, palpitations and leg swelling.   Gastrointestinal:  Negative for abdominal pain, blood in stool, constipation, diarrhea, nausea and vomiting.   Endocrine: Negative for cold intolerance and heat intolerance.   Genitourinary:  Negative for difficulty urinating, dysuria, frequency, hematuria and urgency.   Musculoskeletal:  Positive for arthralgias. Negative for back pain, gait problem, joint swelling, myalgias and neck pain.   Skin:  Negative for rash and wound.   Allergic/Immunologic: Negative for immunocompromised state.   Neurological:  Negative for dizziness, tremors, seizures, syncope, speech difficulty, weakness, light-headedness, numbness and headaches.   Psychiatric/Behavioral:  Negative for confusion, hallucinations and sleep disturbance. The patient is not nervous/anxious.         REVIEWED INFORMATION      Current Outpatient Medications   Medication Sig Dispense Refill    amLODIPine (NORVASC) 5 MG tablet Take 1

## 2025-02-14 DIAGNOSIS — I10 ESSENTIAL HYPERTENSION: Primary | ICD-10-CM

## 2025-02-14 RX ORDER — TRIAMTERENE AND HYDROCHLOROTHIAZIDE 75; 50 MG/1; MG/1
0.5 TABLET ORAL DAILY
Qty: 90 TABLET | Refills: 1 | Status: SHIPPED | OUTPATIENT
Start: 2025-02-14

## 2025-02-14 NOTE — TELEPHONE ENCOUNTER
triamterene-hydroCHLOROthiazide (MAXZIDE) 75-50 MG per tablet  Take 0.5 tablets by mouth daily, Last Dose: Not Recorded

## 2025-02-14 NOTE — TELEPHONE ENCOUNTER
Perri Diaz is calling to request a refill on the following medication(s):    Last Visit Date (If Applicable):  12/5/2024    Next Visit Date:    6/5/2025    Medication Request:  Requested Prescriptions     Pending Prescriptions Disp Refills    triamterene-hydroCHLOROthiazide (MAXZIDE) 75-50 MG per tablet 30 tablet 1     Sig: Take 0.5 tablets by mouth daily

## 2025-05-30 ENCOUNTER — HOSPITAL ENCOUNTER (OUTPATIENT)
Age: 55
Setting detail: SPECIMEN
Discharge: HOME | End: 2025-05-30

## 2025-05-30 DIAGNOSIS — E78.5 DYSLIPIDEMIA: ICD-10-CM

## 2025-05-30 LAB
ALBUMIN SERPL-MCNC: 4.4 G/DL (ref 3.5–5.2)
ALBUMIN/GLOB SERPL: 1.5 {RATIO} (ref 1–2.5)
ALP SERPL-CCNC: 82 U/L (ref 35–104)
ALT SERPL-CCNC: 16 U/L (ref 10–35)
ANION GAP SERPL CALCULATED.3IONS-SCNC: 14 MMOL/L (ref 9–16)
AST SERPL-CCNC: 25 U/L (ref 10–35)
BILIRUB SERPL-MCNC: 0.4 MG/DL (ref 0–1.2)
BUN SERPL-MCNC: 15 MG/DL (ref 6–20)
CALCIUM SERPL-MCNC: 9.6 MG/DL (ref 8.6–10.4)
CHLORIDE SERPL-SCNC: 102 MMOL/L (ref 98–107)
CHOLEST SERPL-MCNC: 197 MG/DL (ref 0–199)
CHOLESTEROL/HDL RATIO: 3.8
CO2 SERPL-SCNC: 24 MMOL/L (ref 20–31)
CREAT SERPL-MCNC: 0.9 MG/DL (ref 0.6–0.9)
GFR, ESTIMATED: 75 ML/MIN/1.73M2
GLUCOSE SERPL-MCNC: 83 MG/DL (ref 74–99)
HDLC SERPL-MCNC: 52 MG/DL
LDLC SERPL CALC-MCNC: 126 MG/DL (ref 0–100)
POTASSIUM SERPL-SCNC: 3.6 MMOL/L (ref 3.7–5.3)
PROT SERPL-MCNC: 7.4 G/DL (ref 6.6–8.7)
SODIUM SERPL-SCNC: 140 MMOL/L (ref 136–145)
TRIGL SERPL-MCNC: 96 MG/DL
TSH SERPL DL<=0.05 MIU/L-ACNC: 1.69 UIU/ML (ref 0.27–4.2)
VLDLC SERPL CALC-MCNC: 19 MG/DL (ref 1–30)

## 2025-06-02 ENCOUNTER — RESULTS FOLLOW-UP (OUTPATIENT)
Age: 55
End: 2025-06-02

## 2025-06-05 ENCOUNTER — OFFICE VISIT (OUTPATIENT)
Age: 55
End: 2025-06-05
Payer: COMMERCIAL

## 2025-06-05 VITALS
SYSTOLIC BLOOD PRESSURE: 122 MMHG | WEIGHT: 167.2 LBS | TEMPERATURE: 97 F | HEIGHT: 61 IN | BODY MASS INDEX: 31.57 KG/M2 | OXYGEN SATURATION: 97 % | HEART RATE: 71 BPM | DIASTOLIC BLOOD PRESSURE: 70 MMHG

## 2025-06-05 DIAGNOSIS — J45.30 MILD PERSISTENT ASTHMA WITHOUT COMPLICATION: ICD-10-CM

## 2025-06-05 DIAGNOSIS — I10 ESSENTIAL HYPERTENSION: Primary | ICD-10-CM

## 2025-06-05 DIAGNOSIS — E87.6 HYPOKALEMIA: ICD-10-CM

## 2025-06-05 DIAGNOSIS — M06.09 RHEUMATOID ARTHRITIS OF MULTIPLE SITES WITH NEGATIVE RHEUMATOID FACTOR (HCC): ICD-10-CM

## 2025-06-05 DIAGNOSIS — E78.5 DYSLIPIDEMIA: ICD-10-CM

## 2025-06-05 PROCEDURE — 99214 OFFICE O/P EST MOD 30 MIN: CPT | Performed by: PHYSICIAN ASSISTANT

## 2025-06-05 PROCEDURE — 3074F SYST BP LT 130 MM HG: CPT | Performed by: PHYSICIAN ASSISTANT

## 2025-06-05 PROCEDURE — 3078F DIAST BP <80 MM HG: CPT | Performed by: PHYSICIAN ASSISTANT

## 2025-06-05 RX ORDER — TRIAMTERENE AND HYDROCHLOROTHIAZIDE 75; 50 MG/1; MG/1
0.5 TABLET ORAL DAILY
Qty: 90 TABLET | Refills: 1 | Status: SHIPPED | OUTPATIENT
Start: 2025-06-05

## 2025-06-05 ASSESSMENT — ENCOUNTER SYMPTOMS
COUGH: 0
RHINORRHEA: 0
WHEEZING: 0
NAUSEA: 0
SINUS PRESSURE: 0
EYE REDNESS: 0
BLOOD IN STOOL: 0
VOMITING: 0
BACK PAIN: 0
SINUS PAIN: 0
DIARRHEA: 0
ABDOMINAL PAIN: 0
SHORTNESS OF BREATH: 0
CONSTIPATION: 0
EYE PAIN: 0
SORE THROAT: 0

## 2025-06-05 NOTE — PROGRESS NOTES
[facility]     Date of Visit:  2025  Patient Name: Perri Diaz   Patient :  1970     CHIEF COMPLAINT:     Perri Diaz is a 54 y.o. female who presents today for an general visit to be evaluated for the following condition(s):  Chief Complaint   Patient presents with    Follow-up     Htn        HISTORY OF PRESENT ILLNESS      Has appt with rheumatologist .  She is on a higher dose of sulfasalazine at 1000 mg twice a day which does help a little bit.  Takes ibuprofen as needed.  Still has pain in both hands.  Occasionally has left shoulder and neck pain. Sometimes feels like pain if on left side of scalp, lasts for about 2 minutes , occurs maybe once a week    REVIEW OF SYSTEMS     Review of Systems   Constitutional:  Negative for chills, fever and unexpected weight change.   HENT:  Negative for congestion, ear pain, hearing loss, rhinorrhea, sinus pressure, sinus pain, sneezing and sore throat.    Eyes:  Negative for pain, redness and visual disturbance.   Respiratory:  Negative for cough, shortness of breath and wheezing.    Cardiovascular:  Negative for chest pain, palpitations and leg swelling.   Gastrointestinal:  Negative for abdominal pain, blood in stool, constipation, diarrhea, nausea and vomiting.   Endocrine: Negative for cold intolerance and heat intolerance.   Genitourinary:  Negative for difficulty urinating, dysuria, frequency, hematuria and urgency.   Musculoskeletal:  Positive for arthralgias. Negative for back pain, gait problem, joint swelling, myalgias and neck pain.   Skin:  Negative for rash and wound.   Allergic/Immunologic: Negative for immunocompromised state.   Neurological:  Negative for dizziness, tremors, seizures, syncope, speech difficulty, weakness, light-headedness, numbness and headaches.   Psychiatric/Behavioral:  Negative for confusion, hallucinations and sleep disturbance. The patient is not nervous/anxious.         REVIEWED INFORMATION

## 2025-06-24 ENCOUNTER — HOSPITAL ENCOUNTER (OUTPATIENT)
Age: 55
Setting detail: SPECIMEN
Discharge: HOME | End: 2025-06-24

## 2025-06-24 DIAGNOSIS — E87.6 HYPOKALEMIA: ICD-10-CM

## 2025-06-24 LAB
ANION GAP SERPL CALCULATED.3IONS-SCNC: 10 MMOL/L (ref 9–16)
BUN SERPL-MCNC: 22 MG/DL (ref 6–20)
CALCIUM SERPL-MCNC: 9.7 MG/DL (ref 8.6–10.4)
CHLORIDE SERPL-SCNC: 101 MMOL/L (ref 98–107)
CO2 SERPL-SCNC: 28 MMOL/L (ref 20–31)
CREAT SERPL-MCNC: 0.9 MG/DL (ref 0.6–0.9)
GFR, ESTIMATED: 75 ML/MIN/1.73M2
GLUCOSE SERPL-MCNC: 96 MG/DL (ref 74–99)
POTASSIUM SERPL-SCNC: 3.8 MMOL/L (ref 3.7–5.3)
SODIUM SERPL-SCNC: 139 MMOL/L (ref 136–145)

## 2025-06-25 ENCOUNTER — RESULTS FOLLOW-UP (OUTPATIENT)
Age: 55
End: 2025-06-25

## 2025-07-12 NOTE — PROGRESS NOTES
[facility], [facility]  [address]  [address]  [facility] [place] [facility] [place] [facility] [address]  Dept: [phone]    Chief complaint:   Chief Complaint   Patient presents with    Annual Exam    New Patient       History Present Illness: Perri is a 55 yo female who presents for her annual exam and to establish care.  She denies any GYN complaints.  She had menarche at age 9.  She had regular periods until they stopped and she went through menopause about 3-4 years ago.  She is sexually active and only occasionally has pain with intercourse.  She does have some slight vaginal dryness.  She denies any vaginal discharge or irritation.  She denies any pelvic pain.  She says that sometimes her  incision sometimes gets painful and has a stinging feeling with some redness on the edge near the groin.  She denies any h/o abnormal Paps.  She denies any bowel or bladder issues.     Current Medications (OTC/Herbal):   Current Outpatient Medications   Medication Sig Dispense Refill    triamterene-hydroCHLOROthiazide (MAXZIDE) 75-50 MG per tablet Take 0.5 tablets by mouth daily 90 tablet 1    amLODIPine (NORVASC) 5 MG tablet Take 1 tablet by mouth daily 90 tablet 3    Omega-3 Fatty Acids (FISH OIL) 600 MG CAPS Take by mouth      Turmeric 5-1000 MG CAPS Take by mouth      atorvastatin (LIPITOR) 10 MG tablet Take 1 tablet by mouth daily 90 tablet 3    albuterol sulfate HFA (PROVENTIL;VENTOLIN;PROAIR) 108 (90 Base) MCG/ACT inhaler Inhale 2 puffs into the lungs in the morning and at bedtime      sulfaSALAzine (AZULFIDINE) 500 MG tablet Take 2 tablets by mouth 2 times daily      fluticasone-salmeterol (ADVAIR DISKUS) 250-50 MCG/ACT AEPB diskus inhaler Inhale 1 puff into the lungs as needed      Multiple Vitamins-Minerals (THERAPEUTIC MULTIVITAMIN-MINERALS) tablet Take 1 tablet by mouth daily      Glucosamine 500 MG CAPS Take by mouth      hydrOXYzine HCl (ATARAX) 25 MG tablet

## 2025-07-14 ENCOUNTER — HOSPITAL ENCOUNTER (OUTPATIENT)
Age: 55
Setting detail: SPECIMEN
Discharge: HOME | End: 2025-07-14

## 2025-07-14 ENCOUNTER — OFFICE VISIT (OUTPATIENT)
Dept: OBGYN CLINIC | Age: 55
End: 2025-07-14
Payer: COMMERCIAL

## 2025-07-14 VITALS
BODY MASS INDEX: 30.99 KG/M2 | SYSTOLIC BLOOD PRESSURE: 114 MMHG | HEART RATE: 72 BPM | DIASTOLIC BLOOD PRESSURE: 81 MMHG | WEIGHT: 164 LBS

## 2025-07-14 DIAGNOSIS — Z12.31 ENCOUNTER FOR SCREENING MAMMOGRAM FOR MALIGNANT NEOPLASM OF BREAST: ICD-10-CM

## 2025-07-14 DIAGNOSIS — Z76.89 ENCOUNTER TO ESTABLISH CARE WITH NEW DOCTOR: Primary | ICD-10-CM

## 2025-07-14 DIAGNOSIS — Z01.419 ENCOUNTER FOR WELL WOMAN EXAM WITH ROUTINE GYNECOLOGICAL EXAM: ICD-10-CM

## 2025-07-14 PROCEDURE — 99386 PREV VISIT NEW AGE 40-64: CPT | Performed by: OBSTETRICS & GYNECOLOGY

## 2025-07-14 PROCEDURE — 99459 PELVIC EXAMINATION: CPT | Performed by: OBSTETRICS & GYNECOLOGY

## 2025-07-14 PROCEDURE — 3079F DIAST BP 80-89 MM HG: CPT | Performed by: OBSTETRICS & GYNECOLOGY

## 2025-07-14 PROCEDURE — 3074F SYST BP LT 130 MM HG: CPT | Performed by: OBSTETRICS & GYNECOLOGY

## 2025-07-14 RX ORDER — BISACODYL 5 MG/1
TABLET, DELAYED RELEASE ORAL
COMMUNITY

## 2025-07-14 RX ORDER — HYDROXYZINE HYDROCHLORIDE 25 MG/1
TABLET, FILM COATED ORAL
COMMUNITY

## 2025-07-14 RX ORDER — MECLIZINE HCL 12.5 MG 12.5 MG/1
TABLET ORAL
COMMUNITY

## 2025-07-14 RX ORDER — SODIUM, POTASSIUM,MAG SULFATES 17.5-3.13G
SOLUTION, RECONSTITUTED, ORAL ORAL
COMMUNITY

## 2025-07-14 RX ORDER — CYCLOBENZAPRINE HCL 10 MG
TABLET ORAL
COMMUNITY

## 2025-07-14 RX ORDER — POTASSIUM CHLORIDE 750 MG/1
CAPSULE, EXTENDED RELEASE ORAL
COMMUNITY

## 2025-07-14 ASSESSMENT — ENCOUNTER SYMPTOMS
COUGH: 0
ABDOMINAL PAIN: 0
SHORTNESS OF BREATH: 0
BACK PAIN: 0

## 2025-07-15 LAB
HPV I/H RISK 4 DNA CVX QL NAA+PROBE: NOT DETECTED
HPV SAMPLE: NORMAL
HPV, INTERPRETATION: NORMAL
HPV16 DNA CVX QL NAA+PROBE: NOT DETECTED
HPV18 DNA CVX QL NAA+PROBE: NOT DETECTED
SPECIMEN DESCRIPTION: NORMAL

## 2025-08-04 LAB — CYTOLOGY REPORT: NORMAL

## 2025-08-19 ENCOUNTER — HOSPITAL ENCOUNTER (OUTPATIENT)
Age: 55
Discharge: HOME | End: 2025-08-21
Attending: OBSTETRICS & GYNECOLOGY
Payer: COMMERCIAL

## 2025-08-19 DIAGNOSIS — Z12.31 ENCOUNTER FOR SCREENING MAMMOGRAM FOR MALIGNANT NEOPLASM OF BREAST: ICD-10-CM

## 2025-08-19 PROCEDURE — 77063 BREAST TOMOSYNTHESIS BI: CPT

## 2025-08-27 DIAGNOSIS — E78.5 DYSLIPIDEMIA: ICD-10-CM

## 2025-08-27 RX ORDER — ATORVASTATIN CALCIUM 10 MG/1
10 TABLET, FILM COATED ORAL DAILY
Qty: 90 TABLET | Refills: 3 | Status: SHIPPED | OUTPATIENT
Start: 2025-08-27